# Patient Record
Sex: MALE | Race: WHITE | Employment: FULL TIME | URBAN - METROPOLITAN AREA
[De-identification: names, ages, dates, MRNs, and addresses within clinical notes are randomized per-mention and may not be internally consistent; named-entity substitution may affect disease eponyms.]

---

## 2017-01-03 ENCOUNTER — TELEPHONE (OUTPATIENT)
Dept: FAMILY MEDICINE CLINIC | Age: 57
End: 2017-01-03

## 2017-01-03 NOTE — TELEPHONE ENCOUNTER
Pt states that he was wanting to go to a Gastroenterologist for his colonoscopy and since there was not a doctor put on his referral request his approval is for a Dr in [facility]. He states that if you give him the information he can call and change it with  . Please advise.

## 2017-01-05 NOTE — TELEPHONE ENCOUNTER
Patient came into office and he will contact [facility] directly to attempt to change these referrals to local providers.

## 2017-01-06 ENCOUNTER — TELEPHONE (OUTPATIENT)
Dept: FAMILY MEDICINE CLINIC | Age: 57
End: 2017-01-06

## 2017-01-06 NOTE — TELEPHONE ENCOUNTER
Shannen Meléndez from [facility] called stated they received pts  referral but it has the wrong location. ector is requesting it to be corrected, it should be [address].  Please advise

## 2017-02-14 LAB — COLONOSCOPY, EXTERNAL: NORMAL

## 2017-03-27 ENCOUNTER — HOSPITAL ENCOUNTER (OUTPATIENT)
Dept: LAB | Age: 57
Discharge: HOME | End: 2017-03-27
Payer: OTHER GOVERNMENT

## 2017-03-27 DIAGNOSIS — E11.9 TYPE 2 DIABETES MELLITUS WITHOUT COMPLICATION, WITHOUT LONG-TERM CURRENT USE OF INSULIN (HCC): ICD-10-CM

## 2017-03-27 LAB
ALBUMIN SERPL BCP-MCNC: 4.1 G/DL (ref 3.4–5)
ALBUMIN/GLOB SERPL: 1.3 {RATIO} (ref 0.8–1.7)
ALP SERPL-CCNC: 49 U/L (ref 45–117)
ALT SERPL-CCNC: 27 U/L (ref 16–61)
ANION GAP BLD CALC-SCNC: 8 MMOL/L (ref 3–18)
AST SERPL W P-5'-P-CCNC: 20 U/L (ref 15–37)
BILIRUB SERPL-MCNC: 0.4 MG/DL (ref 0.2–1)
BUN SERPL-MCNC: 24 MG/DL (ref 7–18)
BUN/CREAT SERPL: 29 (ref 12–20)
CALCIUM SERPL-MCNC: 8.9 MG/DL (ref 8.5–10.1)
CHLORIDE SERPL-SCNC: 100 MMOL/L (ref 100–108)
CO2 SERPL-SCNC: 30 MMOL/L (ref 21–32)
CREAT SERPL-MCNC: 0.82 MG/DL (ref 0.6–1.3)
GLOBULIN SER CALC-MCNC: 3.1 G/DL (ref 2–4)
GLUCOSE SERPL-MCNC: 110 MG/DL (ref 74–99)
HBA1C MFR BLD: 6 % (ref 4.2–5.6)
POTASSIUM SERPL-SCNC: 3.9 MMOL/L (ref 3.5–5.5)
PROT SERPL-MCNC: 7.2 G/DL (ref 6.4–8.2)
SODIUM SERPL-SCNC: 138 MMOL/L (ref 136–145)

## 2017-03-27 PROCEDURE — 80053 COMPREHEN METABOLIC PANEL: CPT | Performed by: FAMILY MEDICINE

## 2017-03-27 PROCEDURE — 83036 HEMOGLOBIN GLYCOSYLATED A1C: CPT | Performed by: FAMILY MEDICINE

## 2017-03-27 PROCEDURE — 36415 COLL VENOUS BLD VENIPUNCTURE: CPT | Performed by: FAMILY MEDICINE

## 2017-04-03 ENCOUNTER — OFFICE VISIT (OUTPATIENT)
Dept: FAMILY MEDICINE CLINIC | Age: 57
End: 2017-04-03

## 2017-04-03 ENCOUNTER — HOSPITAL ENCOUNTER (OUTPATIENT)
Dept: LAB | Age: 57
Discharge: HOME | End: 2017-04-03
Payer: OTHER GOVERNMENT

## 2017-04-03 ENCOUNTER — HOSPITAL ENCOUNTER (OUTPATIENT)
Dept: GENERAL RADIOLOGY | Age: 57
Discharge: HOME | End: 2017-04-03
Payer: OTHER GOVERNMENT

## 2017-04-03 VITALS
HEART RATE: 86 BPM | HEIGHT: 71 IN | OXYGEN SATURATION: 98 % | RESPIRATION RATE: 16 BRPM | SYSTOLIC BLOOD PRESSURE: 130 MMHG | WEIGHT: 289 LBS | TEMPERATURE: 98.6 F | DIASTOLIC BLOOD PRESSURE: 76 MMHG | BODY MASS INDEX: 40.46 KG/M2

## 2017-04-03 DIAGNOSIS — E78.00 PURE HYPERCHOLESTEROLEMIA: ICD-10-CM

## 2017-04-03 DIAGNOSIS — I10 ESSENTIAL HYPERTENSION: ICD-10-CM

## 2017-04-03 DIAGNOSIS — M79.671 RIGHT FOOT PAIN: ICD-10-CM

## 2017-04-03 DIAGNOSIS — Z00.00 WELL ADULT EXAM: Primary | ICD-10-CM

## 2017-04-03 DIAGNOSIS — E11.9 TYPE 2 DIABETES MELLITUS WITHOUT COMPLICATION, WITHOUT LONG-TERM CURRENT USE OF INSULIN (HCC): ICD-10-CM

## 2017-04-03 DIAGNOSIS — Z98.890 HX OF COLONOSCOPY: ICD-10-CM

## 2017-04-03 PROCEDURE — 73630 X-RAY EXAM OF FOOT: CPT

## 2017-04-03 RX ORDER — LISINOPRIL 40 MG/1
40 TABLET ORAL DAILY
Qty: 90 TAB | Refills: 3 | Status: SHIPPED | OUTPATIENT
Start: 2017-04-03 | End: 2017-07-10 | Stop reason: SDUPTHER

## 2017-04-03 RX ORDER — ESOMEPRAZOLE MAGNESIUM 40 MG/1
40 CAPSULE, DELAYED RELEASE ORAL EVERY OTHER DAY
Qty: 90 CAP | Refills: 3 | Status: SHIPPED | OUTPATIENT
Start: 2017-04-03 | End: 2017-07-10

## 2017-04-03 RX ORDER — HYDROCHLOROTHIAZIDE 25 MG/1
25 TABLET ORAL DAILY
Qty: 90 TAB | Refills: 3 | Status: SHIPPED | OUTPATIENT
Start: 2017-04-03 | End: 2017-07-10 | Stop reason: SDUPTHER

## 2017-04-03 RX ORDER — ATORVASTATIN CALCIUM 20 MG/1
20 TABLET, FILM COATED ORAL DAILY
Qty: 90 TAB | Refills: 3 | Status: SHIPPED | OUTPATIENT
Start: 2017-04-03 | End: 2017-07-10 | Stop reason: SDUPTHER

## 2017-04-03 NOTE — PATIENT INSTRUCTIONS

## 2017-04-03 NOTE — PROGRESS NOTES
Chief Complaint   Patient presents with    Diabetes    Hypertension    Cholesterol Problem    Foot Injury     glass in left heel possibly     1. Have you been to the ER, urgent care clinic since your last visit? Hospitalized since your last visit? No    2. Have you seen or consulted any other health care providers outside of the [address] [place] [address] since your last visit? Include any pap smears or colon screening.  Dr. Poonam Rich 72 24/2017

## 2017-04-03 NOTE — MR AVS SNAPSHOT
Visit Information Date & Time Provider Department Dept. Phone Encounter #  
 4/3/2017  3:00 PM Jay Bolden, [address] [id number] Follow-up Instructions Return in about 3 months (around 7/3/2017), or if symptoms worsen or fail to improve. Your Appointments 4/3/2017  3:00 PM  
ROUTINE CARE with Jay Bolden MD  
[address] [facility] Appt Note: 3 mo fu  
 [address] [facility] [address]  
[address] [facility] [address] [facility] [address] Upcoming Health Maintenance Date Due COLONOSCOPY 3/1/2017 EYE EXAM RETINAL OR DILATED Q1 9/23/2017 HEMOGLOBIN A1C Q6M 9/27/2017 MICROALBUMIN Q1 12/20/2017 LIPID PANEL Q1 12/20/2017 FOOT EXAM Q1 12/28/2017 DTaP/Tdap/Td series (2 - Td) 2/5/2026 Allergies as of 4/3/2017  Review Complete On: 4/3/2017 By: Jay Bolden MD  
 No Known Allergies Current Immunizations  Reviewed on 11/5/2015 Name Date Hep B Vaccine (Adult) 5/5/2016, 12/3/2015, 11/5/2015 Influenza Vaccine (Quad) PF 10/1/2015 Pneumococcal Polysaccharide (PPSV-23) 11/5/2015 Tdap 2/5/2016 Not reviewed this visit You Were Diagnosed With   
  
 Codes Comments Well adult exam    -  Primary ICD-10-CM: Z00.00 ICD-9-CM: V70.0 Hx of colonoscopy     ICD-10-CM: Z98.890 ICD-9-CM: V45.89 Essential hypertension     ICD-10-CM: I10 
ICD-9-CM: 401.9 Pure hypercholesterolemia     ICD-10-CM: E78.00 ICD-9-CM: 272.0 Type 2 diabetes mellitus without complication, without long-term current use of insulin (HCC)     ICD-10-CM: E11.9 ICD-9-CM: 250.00 Right foot pain     ICD-10-CM: M79.671 ICD-9-CM: 729.5 Vitals BP Pulse Temp Resp Height(growth percentile) Weight(growth percentile)  130/76 (BP 1 Location: Left arm, BP Patient Position: Sitting) 86 98.6 °F (37 °C) (Oral) 16 5' 11\" (1.803 m) 289 lb (131.1 kg) SpO2 BMI Smoking Status 98% 40.31 kg/m2 Never Smoker Vitals History BMI and BSA Data Body Mass Index Body Surface Area  
 40.31 kg/m 2 2.56 m 2 Preferred Pharmacy Pharmacy Name Phone [address] [phone] Your Updated Medication List  
  
   
This list is accurate as of: 4/3/17  2:51 PM.  Always use your most recent med list.  
  
  
  
  
 aspirin delayed-release 81 mg tablet Take 1 Tab by mouth daily. atorvastatin 20 mg tablet Commonly known as:  LIPITOR Take 1 Tab by mouth daily. cholecalciferol (VITAMIN D3) 5,000 unit Tab tablet Commonly known as:  VITAMIN D3 Take 10,000 Units by mouth daily. esomeprazole 40 mg capsule Commonly known as:  NexIUM Take 1 Cap by mouth every other day. FISH OIL 1,000 mg Cap Generic drug:  omega-3 fatty acids-vitamin e Take 2 Caps by mouth two (2) times a day. glucose blood VI test strips strip Commonly known as:  PRECISION XTRA TEST Check fasting glucose once daily  
  
 hydroCHLOROthiazide 25 mg tablet Commonly known as:  HYDRODIURIL Take 1 Tab by mouth daily. lisinopril 40 mg tablet Commonly known as:  Marielena Piyush Take 1 Tab by mouth daily. magnesium oxide 400 mg tablet Commonly known as:  MAG-OX Take 400 mg by mouth daily. metFORMIN 1,000 mg tablet Commonly known as:  GLUCOPHAGE Take 1 Tab by mouth two (2) times daily (with meals). POTASSIUM CHLORIDE Take 595 mg by mouth daily. TANZEUM 30 mg/0.5 mL injector pen Generic drug:  albiglutide  
  
 vitamin E acetate 400 unit Cap capsule Take 400 Units by mouth daily. zinc 50 mg Tab tablet Take 50 mg by mouth daily. Prescriptions Sent to Pharmacy Refills  
 atorvastatin (LIPITOR) 20 mg tablet 3 Sig: Take 1 Tab by mouth daily. Class: Normal  
 Pharmacy: [address], [address] [facility] Ph #: [phone] Route: Oral  
 hydroCHLOROthiazide (HYDRODIURIL) 25 mg tablet 3 Sig: Take 1 Tab by mouth daily. Class: Normal  
 Pharmacy: [address], [address] [place] [address] Ph #: [phone] Route: Oral  
 lisinopril (PRINIVIL, ZESTRIL) 40 mg tablet 3 Sig: Take 1 Tab by mouth daily. Class: Normal  
 Pharmacy: [address], [address] [place] [address] Ph #: [phone] Route: Oral  
 esomeprazole (NEXIUM) 40 mg capsule 3 Sig: Take 1 Cap by mouth every other day. Class: Normal  
 Pharmacy: [address], [address] Ph #: [phone] Route: Oral  
  
Follow-up Instructions Return in about 3 months (around 7/3/2017), or if symptoms worsen or fail to improve. To-Do List   
 04/03/2017 Imaging:  XR FOOT RT MIN 3 V Patient Instructions Learning About Diabetes Food Guidelines Your Care Instructions Meal planning is important to manage diabetes. It helps keep your blood sugar at a target level (which you set with your doctor). You don't have to eat special foods. You can eat what your family eats, including sweets once in a while. But you do have to pay attention to how often you eat and how much you eat of certain foods. You may want to work with a dietitian or a certified diabetes educator (CDE) to help you plan meals and snacks. A dietitian or CDE can also help you lose weight if that is one of your goals. What should you know about eating carbs? Managing the amount of carbohydrate (carbs) you eat is an important part of healthy meals when you have diabetes. Carbohydrate is found in many foods. · Learn which foods have carbs. And learn the amounts of carbs in different foods.  
¨ Bread, cereal, pasta, and rice have about 15 grams of carbs in a serving. A serving is 1 slice of bread (1 ounce), ½ cup of cooked cereal, or 1/3 cup of cooked pasta or rice. ¨ Fruits have 15 grams of carbs in a serving. A serving is 1 small fresh fruit, such as an apple or orange; ½ of a banana; ½ cup of cooked or canned fruit; ½ cup of fruit juice; 1 cup of melon or raspberries; or 2 tablespoons of dried fruit. ¨ Milk and no-sugar-added yogurt have 15 grams of carbs in a serving. A serving is 1 cup of milk or 2/3 cup of no-sugar-added yogurt. ¨ Starchy vegetables have 15 grams of carbs in a serving. A serving is ½ cup of mashed potatoes or sweet potato; 1 cup winter squash; ½ of a small baked potato; ½ cup of cooked beans; or ½ cup cooked corn or green peas. · Learn how much carbs to eat each day and at each meal. A dietitian or CDE can teach you how to keep track of the amount of carbs you eat. This is called carbohydrate counting. · If you are not sure how to count carbohydrate grams, use the Plate Method to plan meals. It is a good, quick way to make sure that you have a balanced meal. It also helps you spread carbs throughout the day. ¨ Divide your plate by types of foods. Put non-starchy vegetables on half the plate, meat or other protein food on one-quarter of the plate, and a grain or starchy vegetable in the final quarter of the plate. To this you can add a small piece of fruit and 1 cup of milk or yogurt, depending on how many carbs you are supposed to eat at a meal. 
· Try to eat about the same amount of carbs at each meal. Do not \"save up\" your daily allowance of carbs to eat at one meal. 
· Proteins have very little or no carbs per serving. Examples of proteins are beef, chicken, turkey, fish, eggs, tofu, cheese, cottage cheese, and peanut butter. A serving size of meat is 3 ounces, which is about the size of a deck of cards.  Examples of meat substitute serving sizes (equal to 1 ounce of meat) are 1/4 cup of cottage cheese, 1 egg, 1 tablespoon of peanut butter, and ½ cup of tofu. How can you eat out and still eat healthy? · Learn to estimate the serving sizes of foods that have carbohydrate. If you measure food at home, it will be easier to estimate the amount in a serving of restaurant food. · If the meal you order has too much carbohydrate (such as potatoes, corn, or baked beans), ask to have a low-carbohydrate food instead. Ask for a salad or green vegetables. · If you use insulin, check your blood sugar before and after eating out to help you plan how much to eat in the future. · If you eat more carbohydrate at a meal than you had planned, take a walk or do other exercise. This will help lower your blood sugar. What else should you know? · Limit saturated fat, such as the fat from meat and dairy products. This is a healthy choice because people who have diabetes are at higher risk of heart disease. So choose lean cuts of meat and nonfat or low-fat dairy products. Use olive or canola oil instead of butter or shortening when cooking. · Don't skip meals. Your blood sugar may drop too low if you skip meals and take insulin or certain medicines for diabetes. · Check with your doctor before you drink alcohol. Alcohol can cause your blood sugar to drop too low. Alcohol can also cause a bad reaction if you take certain diabetes medicines. Follow-up care is a key part of your treatment and safety. Be sure to make and go to all appointments, and call your doctor if you are having problems. It's also a good idea to know your test results and keep a list of the medicines you take. Where can you learn more? Go to http://agus-john.info/. Enter F052 in the search box to learn more about \"Learning About Diabetes Food Guidelines. \" Current as of: May 23, 2016 Content Version: 11.2 © 3289-6702 [employer].  Care instructions adapted under license by [employer] (which disclaims liability or warranty for this information). If you have questions about a medical condition or this instruction, always ask your healthcare professional. [address] any warranty or liability for your use of this information. Introducing [facility] SERVICES! Dear Aurora Toribio: 
Thank you for requesting a Split account. Our records indicate that you already have an active Split account. You can access your account anytime at [email or web address]. [email or web address] Did you know that you can access your hospital and ER discharge instructions at any time in Split? You can also review all of your test results from your hospital stay or ER visit. Additional Information If you have questions, please visit the Frequently Asked Questions section of the Split website at [email or web address] Remember, Split is NOT to be used for urgent needs. For medical emergencies, dial 911. Now available from your iPhone and Android! Please provide this summary of care documentation to your next provider. Your primary care clinician is listed as Farida Clay. If you have any questions after today's visit, please call [phone].

## 2017-04-03 NOTE — PROGRESS NOTES
Subjective:   Micki Damon. is a 56 y.o. male presenting for his annual checkup. ROS:  Feeling well. No dyspnea or chest pain on exertion. No abdominal pain, change in bowel habits, black or bloody stools. No urinary tract or prostatic symptoms. No neurological complaints. Specific concerns today:   DM/HL-says doing well, continues to follow Diet/daily exercise. Reviewed labs. Taking medications regularly. Last week, broken glass at home he vacuumed, but feels a prickly pain on R heel, wondering if glass splinter inside. No bleeding, discharge. Pain when he rolls his R foot from side to side    Past Medical History:   Diagnosis Date    Agatston CAC score, <100 12/09/2015    Coronary calcium score 0.    Diabetes ([address]     Dyslipidemia     Echocardiogram 11/09/2015    EF 60%. No WMA. Mild LVH. Normal LV diastolic fx. Mild RVE.  GERD (gastroesophageal reflux disease)     Hx of colonoscopy 02/15/2017    repeat 5  yrs 2022, dr. Elle De Los Santos Hypertension     VALDES (nonalcoholic steatohepatitis)     Nuclear cardiac imaging test 11/09/2015    Low risk. No ischemia or infarction by perfusion imaging. EF 61%. No RWMA. Likely false positive EKG chgs on max EST. No chest pain. Ex time 9 mins 15 secs.  JANELLE on CPAP      History reviewed. No pertinent surgical history. Family History   Problem Relation Age of Onset    Hypertension Mother     Elevated Lipids Mother     Diabetes Mother     Cancer Father      Prostate     Social History   Substance Use Topics    Smoking status: Never Smoker    Smokeless tobacco: Never Used    Alcohol use Yes      Comment: 4/week          Objective:     Visit Vitals    /76 (BP 1 Location: Left arm, BP Patient Position: Sitting)    Pulse 86    Temp 98.6 °F (37 °C) (Oral)    Resp 16    Ht 5' 11\" (1.803 m)    Wt 289 lb (131.1 kg)    SpO2 98%    BMI 40.31 kg/m2     The patient appears well, alert, oriented x 3, in no distress.    ENT normal.  Neck supple. No adenopathy or thyromegaly. MANSOOR. Lungs are clear, good air entry, no wheezes, rhonchi or rales. S1 and S2 normal, no murmurs, regular rate and rhythm. Abdomen is soft without tenderness, guarding, mass or organomegaly. Extremities show no edema, normal peripheral pulses. R foot no tenderness, bleeding, discharge. Neurological is normal without focal findings. Assessment/Plan:   Glo Mckeon was seen today for diabetes, hypertension, cholesterol problem and foot injury. Diagnoses and all orders for this visit:    Well adult exam  healthy adult male  lose weight, increase physical activity, call if any problems. reviewed diet, exercise and weight control. Hx of colonoscopy-update 2022 dr Evelyn Lazaro  Will offer PSA test on next visit      Type 2 diabetes mellitus without complication ([facility] [address]  -well controlled, markedly improved from onset 10>5.7>6.    -continues to see endo, may transition care to me as per pt preference. -referral to podiatry  Eye exam 9/16  Foot exam- 12/16  Microalbumin- 12/16  Lipid panel -12/16  a1c 3/17    Hyperlipidemia  Good range on statin, to cont  Update 12/17    Essential hypertension-  controlled, on ace    Right foot pain  R/o foreign body, discussed that if not radio opaque, will not be seen in xray. i advised to see podiatrist, pt wants to hold off on this  -     XR FOOT RT MIN 3 V; Future    Other orders  -     atorvastatin (LIPITOR) 20 mg tablet; Take 1 Tab by mouth daily. -     hydroCHLOROthiazide (HYDRODIURIL) 25 mg tablet; Take 1 Tab by mouth daily. -     lisinopril (PRINIVIL, ZESTRIL) 40 mg tablet; Take 1 Tab by mouth daily. -     esomeprazole (NEXIUM) 40 mg capsule; Take 1 Cap by mouth every other day. Ff-up in 3 months or sooner prn. Patient/guardian understands plan of care. Patient has provided input and agrees with goals. Future labs to be discussed on next visit.

## 2017-04-04 NOTE — PROGRESS NOTES
Patient identified with 2 identifiers (name and ). Patient aware of no foreign body on x ray.  Patient advised to consult podiatry if pain persist x 2 weeks

## 2017-04-05 ENCOUNTER — TELEPHONE (OUTPATIENT)
Dept: FAMILY MEDICINE CLINIC | Age: 57
End: 2017-04-05

## 2017-04-05 NOTE — TELEPHONE ENCOUNTER
Pt is requesting a written request for the shingles vaccination [place] turns 63 yrs. He would like to know if he can pick it up first thing in the morning. Advised that Dr Tyrone Godinez  Was gone for the day. Please advise.

## 2017-05-19 RX ORDER — METFORMIN HYDROCHLORIDE 1000 MG/1
1000 TABLET ORAL 2 TIMES DAILY WITH MEALS
Qty: 180 TAB | Refills: 3 | Status: CANCELLED | OUTPATIENT
Start: 2017-05-19

## 2017-05-19 NOTE — TELEPHONE ENCOUNTER
This pharmacy faxed over request for the following prescriptions to be filled:    Medication requested :   Requested Prescriptions     Pending Prescriptions Disp Refills    metFORMIN (GLUCOPHAGE) 1,000 mg tablet 180 Tab 3     Sig: Take 1 Tab by mouth two (2) times daily (with meals).      PCP: [address] [place] [address] or Print: [facility]  Mail order or Local pharmacy Mail Order     Scheduled appointment if not seen by current providers in office: [address] [place] [address] 4/3/2017 f/u 7/10/2017

## 2017-05-19 NOTE — TELEPHONE ENCOUNTER
Called Ioana Whitifeld NP (Endocrine) office spoke with James Hubbard given information on refill request for metformin.

## 2017-07-10 ENCOUNTER — OFFICE VISIT (OUTPATIENT)
Dept: FAMILY MEDICINE CLINIC | Age: 57
End: 2017-07-10

## 2017-07-10 ENCOUNTER — HOSPITAL ENCOUNTER (OUTPATIENT)
Dept: LAB | Age: 57
Discharge: HOME | End: 2017-07-10
Payer: OTHER GOVERNMENT

## 2017-07-10 VITALS
TEMPERATURE: 98 F | SYSTOLIC BLOOD PRESSURE: 124 MMHG | DIASTOLIC BLOOD PRESSURE: 82 MMHG | BODY MASS INDEX: 40.88 KG/M2 | WEIGHT: 292 LBS | HEIGHT: 71 IN | OXYGEN SATURATION: 98 % | HEART RATE: 80 BPM | RESPIRATION RATE: 16 BRPM

## 2017-07-10 DIAGNOSIS — I10 ESSENTIAL HYPERTENSION: ICD-10-CM

## 2017-07-10 DIAGNOSIS — K21.9 GASTROESOPHAGEAL REFLUX DISEASE, ESOPHAGITIS PRESENCE NOT SPECIFIED: ICD-10-CM

## 2017-07-10 DIAGNOSIS — E11.9 TYPE 2 DIABETES MELLITUS WITHOUT COMPLICATION, WITHOUT LONG-TERM CURRENT USE OF INSULIN (HCC): Primary | ICD-10-CM

## 2017-07-10 DIAGNOSIS — E78.00 PURE HYPERCHOLESTEROLEMIA: ICD-10-CM
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
## 2017-07-10 PROCEDURE — 86618 LYME DISEASE ANTIBODY: CPT | Performed by: FAMILY MEDICINE

## 2017-07-10 PROCEDURE — 36415 COLL VENOUS BLD VENIPUNCTURE: CPT | Performed by: FAMILY MEDICINE

## 2017-07-10 RX ORDER — ATORVASTATIN CALCIUM 20 MG/1
20 TABLET, FILM COATED ORAL DAILY
Qty: 90 TAB | Refills: 3 | Status: SHIPPED | OUTPATIENT
Start: 2017-07-10

## 2017-07-10 RX ORDER — HYDROCHLOROTHIAZIDE 25 MG/1
25 TABLET ORAL DAILY
Qty: 90 TAB | Refills: 3 | Status: SHIPPED | OUTPATIENT
Start: 2017-07-10

## 2017-07-10 RX ORDER — PANTOPRAZOLE SODIUM 20 MG/1
20 TABLET, DELAYED RELEASE ORAL DAILY
Qty: 90 TAB | Refills: 2 | Status: SHIPPED | OUTPATIENT
Start: 2017-07-10 | End: 2020-04-21

## 2017-07-10 RX ORDER — LISINOPRIL 40 MG/1
40 TABLET ORAL DAILY
Qty: 90 TAB | Refills: 3 | Status: SHIPPED | OUTPATIENT
Start: 2017-07-10

## 2017-07-10 RX ORDER — ASPIRIN 81 MG/1
81 TABLET ORAL DAILY
Qty: 90 TAB | Refills: 3 | Status: SHIPPED | OUTPATIENT
Start: 2017-07-10

## 2017-07-10 NOTE — PATIENT INSTRUCTIONS

## 2017-07-10 NOTE — MR AVS SNAPSHOT
Visit Information Date & Time Provider Department Dept. Phone Encounter #  
 7/10/2017  3:00 PM Cindi Ruff, [address] [facility] [address] [id number] Follow-up Instructions Return in about 5 months (around 12/10/2017), or if symptoms worsen or fail to improve. Upcoming Health Maintenance Date Due INFLUENZA AGE 9 TO ADULT 8/1/2017 EYE EXAM RETINAL OR DILATED Q1 9/23/2017 HEMOGLOBIN A1C Q6M 9/27/2017 MICROALBUMIN Q1 12/20/2017 LIPID PANEL Q1 12/20/2017 FOOT EXAM Q1 12/28/2017 COLONOSCOPY 2/14/2022 DTaP/Tdap/Td series (2 - Td) 2/5/2026 Allergies as of 7/10/2017  Review Complete On: 7/10/2017 By: Cindi Ruff MD  
 No Known Allergies Current Immunizations  Reviewed on 11/5/2015 Name Date Hep B Vaccine (Adult) 5/5/2016, 12/3/2015, 11/5/2015 Influenza Vaccine (Quad) PF 10/1/2015 Pneumococcal Polysaccharide (PPSV-23) 11/5/2015 Tdap 2/5/2016 Not reviewed this visit You Were Diagnosed With   
  
 Codes Comments Type 2 diabetes mellitus without complication, without long-term current use of insulin ([facility])    -  Primary ICD-10-CM: E11.9 ICD-9-CM: 250.00 Essential hypertension     ICD-10-CM: I10 
ICD-9-CM: 401.9 Pure hypercholesterolemia     ICD-10-CM: E78.00 ICD-9-CM: 272.0 Gastroesophageal reflux disease, esophagitis presence not specified     ICD-10-CM: K21.9 ICD-9-CM: 530.81 Vitals BP Pulse Temp Resp Height(growth percentile) Weight(growth percentile) 124/82 (BP 1 Location: Left arm, BP Patient Position: Sitting) 80 98 °F (36.7 °C) (Oral) 16 5' 11\" (1.803 m) 292 lb (132.5 kg) SpO2 BMI Smoking Status 98% 40.73 kg/m2 Never Smoker Vitals History BMI and BSA Data Body Mass Index Body Surface Area 40.73 kg/m 2 2.58 m 2 Preferred Pharmacy Pharmacy Name Phone  [address] [facility] [phone] Your Updated Medication List  
  
   
This list is accurate as of: 7/10/17  3:03 PM.  Always use your most recent med list.  
  
  
  
  
 aspirin delayed-release 81 mg tablet Take 1 Tab by mouth daily. atorvastatin 20 mg tablet Commonly known as:  LIPITOR Take 1 Tab by mouth daily. cholecalciferol (VITAMIN D3) 5,000 unit Tab tablet Commonly known as:  VITAMIN D3 Take 10,000 Units by mouth daily. FISH OIL 1,000 mg Cap Generic drug:  omega-3 fatty acids-vitamin e Take 2 Caps by mouth two (2) times a day. glucose blood VI test strips strip Commonly known as:  PRECISION XTRA TEST Check fasting glucose once daily  
  
 hydroCHLOROthiazide 25 mg tablet Commonly known as:  HYDRODIURIL Take 1 Tab by mouth daily. lisinopril 40 mg tablet Commonly known as:  Helon Estrin Take 1 Tab by mouth daily. magnesium oxide 400 mg tablet Commonly known as:  MAG-OX Take 400 mg by mouth daily. metFORMIN 1,000 mg tablet Commonly known as:  GLUCOPHAGE Take 1 Tab by mouth two (2) times daily (with meals). pantoprazole 20 mg tablet Commonly known as:  PROTONIX Take 1 Tab by mouth daily. POTASSIUM CHLORIDE Take 595 mg by mouth daily. TANZEUM 30 mg/0.5 mL injector pen Generic drug:  albiglutide  
  
 vitamin E acetate 400 unit Cap capsule Take 400 Units by mouth daily. zinc 50 mg Tab tablet Take 50 mg by mouth daily. Prescriptions Sent to Pharmacy Refills  
 atorvastatin (LIPITOR) 20 mg tablet 3 Sig: Take 1 Tab by mouth daily. Class: Normal  
 Pharmacy: [address], [address] Ph #: [phone] Route: Oral  
 hydroCHLOROthiazide (HYDRODIURIL) 25 mg tablet 3 Sig: Take 1 Tab by mouth daily. Class: Normal  
 Pharmacy: [address], [address] Ph #: [phone]  Route: Oral  
 lisinopril (PRINIVIL, ZESTRIL) 40 mg tablet 3 Sig: Take 1 Tab by mouth daily. Class: Normal  
 Pharmacy: [address], [address] Ph #: [phone] Route: Oral  
 aspirin delayed-release 81 mg tablet 3 Sig: Take 1 Tab by mouth daily. Class: Normal  
 Pharmacy: [address], [address] [facility] Ph #: [phone] Route: Oral  
 pantoprazole (PROTONIX) 20 mg tablet 2 Sig: Take 1 Tab by mouth daily. Class: Normal  
 Pharmacy: [address], [address] [facility] Ph #: [phone] Route: Oral  
  
Follow-up Instructions Return in about 5 months (around 12/10/2017), or if symptoms worsen or fail to improve. Patient Instructions Learning About Diabetes Food Guidelines Your Care Instructions Meal planning is important to manage diabetes. It helps keep your blood sugar at a target level (which you set with your doctor). You don't have to eat special foods. You can eat what your family eats, including sweets once in a while. But you do have to pay attention to how often you eat and how much you eat of certain foods. You may want to work with a dietitian or a certified diabetes educator (CDE) to help you plan meals and snacks. A dietitian or CDE can also help you lose weight if that is one of your goals. What should you know about eating carbs? Managing the amount of carbohydrate (carbs) you eat is an important part of healthy meals when you have diabetes. Carbohydrate is found in many foods. · Learn which foods have carbs. And learn the amounts of carbs in different foods. ¨ Bread, cereal, pasta, and rice have about 15 grams of carbs in a serving. A serving is 1 slice of bread (1 ounce), ½ cup of cooked cereal, or 1/3 cup of cooked pasta or rice. ¨ Fruits have 15 grams of carbs in a serving.  A serving is 1 small fresh fruit, such as an apple or orange; ½ of a banana; ½ cup of cooked or canned fruit; ½ cup of fruit juice; 1 cup of melon or raspberries; or 2 tablespoons of dried fruit. ¨ Milk and no-sugar-added yogurt have 15 grams of carbs in a serving. A serving is 1 cup of milk or 2/3 cup of no-sugar-added yogurt. ¨ Starchy vegetables have 15 grams of carbs in a serving. A serving is ½ cup of mashed potatoes or sweet potato; 1 cup winter squash; ½ of a small baked potato; ½ cup of cooked beans; or ½ cup cooked corn or green peas. · Learn how much carbs to eat each day and at each meal. A dietitian or CDE can teach you how to keep track of the amount of carbs you eat. This is called carbohydrate counting. · If you are not sure how to count carbohydrate grams, use the Plate Method to plan meals. It is a good, quick way to make sure that you have a balanced meal. It also helps you spread carbs throughout the day. ¨ Divide your plate by types of foods. Put non-starchy vegetables on half the plate, meat or other protein food on one-quarter of the plate, and a grain or starchy vegetable in the final quarter of the plate. To this you can add a small piece of fruit and 1 cup of milk or yogurt, depending on how many carbs you are supposed to eat at a meal. 
· Try to eat about the same amount of carbs at each meal. Do not \"save up\" your daily allowance of carbs to eat at one meal. 
· Proteins have very little or no carbs per serving. Examples of proteins are beef, chicken, turkey, fish, eggs, tofu, cheese, cottage cheese, and peanut butter. A serving size of meat is 3 ounces, which is about the size of a deck of cards. Examples of meat substitute serving sizes (equal to 1 ounce of meat) are 1/4 cup of cottage cheese, 1 egg, 1 tablespoon of peanut butter, and ½ cup of tofu. How can you eat out and still eat healthy? · Learn to estimate the serving sizes of foods that have carbohydrate.  If you measure food at home, it will be easier to estimate the amount in a serving of restaurant food. · If the meal you order has too much carbohydrate (such as potatoes, corn, or baked beans), ask to have a low-carbohydrate food instead. Ask for a salad or green vegetables. · If you use insulin, check your blood sugar before and after eating out to help you plan how much to eat in the future. · If you eat more carbohydrate at a meal than you had planned, take a walk or do other exercise. This will help lower your blood sugar. What else should you know? · Limit saturated fat, such as the fat from meat and dairy products. This is a healthy choice because people who have diabetes are at higher risk of heart disease. So choose lean cuts of meat and nonfat or low-fat dairy products. Use olive or canola oil instead of butter or shortening when cooking. · Don't skip meals. Your blood sugar may drop too low if you skip meals and take insulin or certain medicines for diabetes. · Check with your doctor before you drink alcohol. Alcohol can cause your blood sugar to drop too low. Alcohol can also cause a bad reaction if you take certain diabetes medicines. Follow-up care is a key part of your treatment and safety. Be sure to make and go to all appointments, and call your doctor if you are having problems. It's also a good idea to know your test results and keep a list of the medicines you take. Where can you learn more? Go to http://agus-john.info/. Enter U823 in the search box to learn more about \"Learning About Diabetes Food Guidelines. \" Current as of: March 13, 2017 Content Version: 11.3 © 2481-8144 [employer]wise, [employer]. Care instructions adapted under license by [employer] (which disclaims liability or warranty for this information).  If you have questions about a medical condition or this instruction, always ask your healthcare professional. [employer] disclaims any warranty or liability for your use of this information. Introducing [facility]! Dear Denzel Ghosh: 
Thank you for requesting a Tri[employer] account. Our records indicate that you already have an active Triposo account. You can access your account anytime at [email or web address] ([email or web address]). [employer] ([email or web address]) Did you know that you can access your hospital and ER discharge instructions at any time in Tri[employer]o? You can also review all of your test results from your hospital stay or ER visit. Additional Information If you have questions, please visit the Frequently Asked Questions section of the Triposo website at [email or web address] ([email or web address]). [employer] ([employer])/. Remember, Tri[employer]o is NOT to be used for urgent needs. For medical emergencies, dial 911. Now available from your iPhone and Android! Please provide this summary of care documentation to your next provider. Your primary care clinician is listed as Catrachita Porter. If you have any questions after today's visit, please call [phone].

## 2017-07-10 NOTE — PROGRESS NOTES
Chief Complaint   Patient presents with    Diabetes     wants to know if you can control his diabetes instead of Dr. Joanna Gutierrez    Hypertension    Cholesterol Problem    Other     requesting test for Lymes disease/ joint pain muscle  pain    GERD     Nexium needs to be changed due to      1. Have you been to the ER, urgent care clinic since your last visit? Hospitalized since your last visit? No    2. Have you seen or consulted any other health care providers outside of the [facility] since your last visit? Include any pap smears or colon screening.  No

## 2017-07-12 LAB — B BURGDOR IGG+IGM SER-ACNC: <0.91 ISR (ref 0–0.9)

## 2017-08-16 ENCOUNTER — TELEPHONE (OUTPATIENT)
Dept: FAMILY MEDICINE CLINIC | Age: 57
End: 2017-08-16

## 2017-08-16 NOTE — TELEPHONE ENCOUNTER
Patient is requesting (New  Updated) referral to the following office:    Speciality: Endocrinology  Specialist Name: ROYA Jarvis Atkins  Office Location: [address]   Phone (if available): [phone]  Fax (if available): [phone]  Diagnosis: E 29.1 E 11.9  Date of appointment (if scheduled): 8/18/2017  Prime

## 2017-08-21 LAB
LDL-C, EXTERNAL: 76
MICROALBUMIN UR TEST STR-MCNC: <3 MG/DL

## 2017-09-05 ENCOUNTER — OFFICE VISIT (OUTPATIENT)
Dept: FAMILY MEDICINE CLINIC | Age: 57
End: 2017-09-05

## 2017-09-05 VITALS
WEIGHT: 298 LBS | BODY MASS INDEX: 41.72 KG/M2 | TEMPERATURE: 97.8 F | SYSTOLIC BLOOD PRESSURE: 126 MMHG | HEIGHT: 71 IN | DIASTOLIC BLOOD PRESSURE: 78 MMHG | HEART RATE: 61 BPM | RESPIRATION RATE: 16 BRPM | OXYGEN SATURATION: 97 %

## 2017-09-05 DIAGNOSIS — E11.9 TYPE 2 DIABETES MELLITUS WITHOUT COMPLICATION, WITHOUT LONG-TERM CURRENT USE OF INSULIN (HCC): ICD-10-CM

## 2017-09-05 DIAGNOSIS — H00.16 CHALAZION, LEFT: Primary | ICD-10-CM

## 2017-09-05 RX ORDER — ESOMEPRAZOLE MAGNESIUM 40 MG/1
40 CAPSULE, DELAYED RELEASE ORAL
COMMUNITY
Start: 2017-06-25 | End: 2017-09-05 | Stop reason: CLARIF

## 2017-09-05 RX ORDER — CEPHALEXIN 500 MG/1
500 CAPSULE ORAL 3 TIMES DAILY
Qty: 30 CAP | Refills: 0 | Status: SHIPPED | OUTPATIENT
Start: 2017-09-05 | End: 2017-09-15

## 2017-09-05 RX ORDER — METFORMIN HYDROCHLORIDE 500 MG/1
500 TABLET ORAL 2 TIMES DAILY
COMMUNITY
Start: 2017-07-18

## 2017-09-05 NOTE — PROGRESS NOTES
1. Have you been to the ER, urgent care clinic since your last visit? Hospitalized since your last visit? No    2. Have you seen or consulted any other health care providers outside of the [address] [place] [address] since your last visit? Include any pap smears or colon screening.  No

## 2017-09-05 NOTE — PATIENT INSTRUCTIONS
Styes and Chalazia: Care Instructions  Your Care Instructions    Styes and chalazia (say \"myf-XEM-atz-uh\") are both conditions that can cause swelling of the eyelid. A stye is an infection in the root of an eyelash. The infection causes a tender red lump on the edge of the eyelid. The infection can spread until the whole eyelid becomes red and inflamed. Styes usually break open, and a tiny amount of pus drains. They usually clear up on their own in about a week, but they sometimes need treatment with antibiotics. A chalazion is a lump or cyst in the eyelid (chalazion is singular; chalazia is plural). It is caused by swelling and inflammation of deep oil glands inside the eyelid. Chalazia are usually not infected. They can take a few months to heal.  If a chalazion becomes more swollen and painful or does not go away, you may need to have it drained by your doctor. Follow-up care is a key part of your treatment and safety. Be sure to make and go to all appointments, and call your doctor if you are having problems. It's also a good idea to know your test results and keep a list of the medicines you take. How can you care for yourself at home? · Do not rub your eyes. Do not squeeze or try to open a stye or chalazion. · To help a stye or chalazion heal faster:  ¨ Put a warm, moist compress on your eye for 5 to 10 minutes, 3 to 6 times a day. Heat often brings a stye to a point where it drains on its own. Keep in mind that warm compresses will often increase swelling a little at first.  ¨ Do not use hot water or heat a wet cloth in a microwave oven. The compress may get too hot and can burn the eyelid. · Always wash your hands before and after you use a compress or touch your eyes. · If the doctor gave you antibiotic drops or ointment, use the medicine exactly as directed. Use the medicine for as long as instructed, even if your eye starts to feel better.   · To put in eyedrops or ointment:  ¨ Tilt your head back, and pull your lower eyelid down with one finger. ¨ Drop or squirt the medicine inside the lower lid. ¨ Close your eye for 30 to 60 seconds to let the drops or ointment move around. ¨ Do not touch the ointment or dropper tip to your eyelashes or any other surface. · Do not wear eye makeup or contact lenses until the stye or chalazion heals. · Do not share towels, pillows, or washcloths while you have a stye. When should you call for help? Call your doctor now or seek immediate medical care if:  · You have pain in your eye. · You have a change in vision or loss of vision. · Redness and swelling get much worse. Watch closely for changes in your health, and be sure to contact your doctor if:  · Your stye does not get better in 1 week. · Your chalazion does not start to get better after several weeks. Where can you learn more? Go to http://agus-john.info/. Enter J832 in the search box to learn more about \"Styes and Chalazia: Care Instructions. \"  Current as of: March 3, 2017  Content Version: 11.3  © 4374-6770 [employer]. Care instructions adapted under license by [employer] (which disclaims liability or warranty for this information). If you have questions about a medical condition or this instruction, always ask your healthcare professional. [address] any warranty or liability for your use of this information.

## 2017-09-05 NOTE — PROGRESS NOTES
SUBJECTIVE  Chief Complaint   Patient presents with   Sabina Rae left inner lower eye lid ongoing past 1 week     The patient presents complaining of a 7 day history left lower eyelid lump. The patient denies discharge and crusting. The patient denies any vision changes, orbital swelling, photophobia, or eye pain. The patient has tried warm compresses and massage. He says that his diabetes is well controlled and is supported by his latest A1c of 6%. He says he can do better with getting more exercise. OBJECTIVE    Blood pressure 126/78, pulse 61, temperature 97.8 °F (36.6 °C), temperature source Oral, resp. rate 16, height 5' 11\" (1.803 m), weight 298 lb (135.2 kg), SpO2 97 %. General:  Alert, cooperative, well appearing, in no apparent distress. Head:  Head is symmetric, normocephalic without evidence of trauma or deformity. Eyes:  The extra-ocular movements are intact. The left lower medial eyelid has a mildly erythematous lump that is about 0.8cm in size. The conjunctiva of the eye is not red nor injected. There is no evidence of scant discharge at the angle of the eye. Skin:  No periorbital swelling or rashes. Psych: normal affect. Mood good. Oriented x 3. Judgement and insight intact. ASSESSMENT / PLAN      ICD-10-CM ICD-9-CM    1. Chalazion, left H00.16 373.2 REFERRAL TO OPHTHALMOLOGY   2. Type 2 diabetes mellitus without complication, without long-term current use of insulin ([facility]) E11.9 250.00    3. BMI 40.0-44.9, adult ([facility] Z68.41 V85.41      Chalazion - pt ed handout. Cont warm compress and gentle massage. He will start keflex 500mg po tid for 10 days. Refer to ophthalmology if not better. DM2 - cont diet and exercise. Has follow-up with PCP in a few months. Obesity - encourage regular exercise. 15 minutes of face to face time spent with the patient with at least 50% on counseling on above medical issues.     All chart history elements were reviewed by me at the time of the visit even though marked at time of note closure. Patient understands our medical plan. Patient has provided input and agrees with goals. Alternatives have been explained and offered. All questions answered. The patient is to call if condition worsens or fails to improve. Follow-up Disposition:  Return Monday, December 18, 2017 02:00 PM with Dr. Selene Osgood.  Please call in 1 week with an eye update.

## 2017-09-05 NOTE — MR AVS SNAPSHOT
Visit Information Date & Time Provider Department Dept. Phone Encounter #  
 9/5/2017  1:30 PM [facility], [address] [place] [address] [id number] Follow-up Instructions Return Monday, December 18, 2017 02:00 PM with Dr. Elli Centeno.  Please call in 1 week with an eye update. Your Appointments 12/18/2017  2:00 PM  
ROUTINE CARE with Justina Wright MD  
[facility] ([address]) Appt Note: routine f/u 5mo [address] [facility] [address]  
[address] [facility] [address] [facility] [address] Upcoming Health Maintenance Date Due INFLUENZA AGE 9 TO ADULT 8/1/2017 EYE EXAM RETINAL OR DILATED Q1 9/23/2017 HEMOGLOBIN A1C Q6M 9/27/2017 MICROALBUMIN Q1 12/20/2017 LIPID PANEL Q1 12/20/2017 FOOT EXAM Q1 12/28/2017 COLONOSCOPY 2/14/2022 DTaP/Tdap/Td series (2 - Td) 2/5/2026 Allergies as of 9/5/2017  Review Complete On: 7/10/2017 By: Justina Wright MD  
 No Known Allergies Current Immunizations  Reviewed on 9/5/2017 Name Date Hep B Vaccine (Adult) 5/5/2016, 12/3/2015, 11/5/2015 Influenza Vaccine (Quad) PF 10/1/2015 Pneumococcal Polysaccharide (PPSV-23) 11/5/2015 Tdap 2/5/2016 Reviewed by Chelsea Cotton on 9/5/2017 at  1:03 PM  
You Were Diagnosed With   
  
 Codes Comments Chalazion, left    -  Primary ICD-10-CM: H00.16 ICD-9-CM: 373.2 Type 2 diabetes mellitus without complication, without long-term current use of insulin (HCC)     ICD-10-CM: E11.9 ICD-9-CM: 250.00 BMI 40.0-44.9, adult [facility])     ICD-10-CM: Z68.41 
ICD-9-CM: V85.41 Vitals BP Pulse Temp Resp Height(growth percentile) Weight(growth percentile) 126/78 (BP 1 Location: Left arm, BP Patient Position: Sitting) 61 97.8 °F (36.6 °C) (Oral) 16 5' 11\" (1.803 m) 298 lb (135.2 kg) SpO2 BMI Smoking Status 97% 41.56 kg/m2 Never Smoker Vitals History BMI and BSA Data Body Mass Index Body Surface Area 41.56 kg/m 2 2.6 m 2 Preferred Pharmacy Pharmacy Name Phone [facility] @ [address] [facility], [address] [place] [address] Roxane Durán [phone] Your Updated Medication List  
  
   
This list is accurate as of: 9/5/17  1:31 PM.  Always use your most recent med list.  
  
  
  
  
 aspirin delayed-release 81 mg tablet Take 1 Tab by mouth daily. atorvastatin 20 mg tablet Commonly known as:  LIPITOR Take 1 Tab by mouth daily. cephALEXin 500 mg capsule Commonly known as:  Lindie Rand Take 1 Cap by mouth three (3) times daily for 10 days. cholecalciferol (VITAMIN D3) 5,000 unit Tab tablet Commonly known as:  VITAMIN D3 Take 10,000 Units by mouth daily. FISH OIL 1,000 mg Cap Generic drug:  omega-3 fatty acids-vitamin e Take 2 Caps by mouth two (2) times a day. glucose blood VI test strips strip Commonly known as:  PRECISION XTRA TEST Check fasting glucose once daily  
  
 hydroCHLOROthiazide 25 mg tablet Commonly known as:  HYDRODIURIL Take 1 Tab by mouth daily. lisinopril 40 mg tablet Commonly known as:  Yoel Julito Take 1 Tab by mouth daily. magnesium oxide 400 mg tablet Commonly known as:  MAG-OX Take 400 mg by mouth daily. * metFORMIN 1,000 mg tablet Commonly known as:  GLUCOPHAGE Take 1 Tab by mouth two (2) times daily (with meals). * metFORMIN 500 mg tablet Commonly known as:  GLUCOPHAGE Take 500 mg by mouth two (2) times a day. pantoprazole 20 mg tablet Commonly known as:  PROTONIX Take 1 Tab by mouth daily. POTASSIUM CHLORIDE Take 595 mg by mouth daily. TANZEUM 30 mg/0.5 mL injector pen Generic drug:  albiglutide  
  
 vitamin E acetate 400 unit Cap capsule Take 400 Units by mouth daily. zinc 50 mg Tab tablet Take 50 mg by mouth daily. * Notice: This list has 2 medication(s) that are the same as other medications prescribed for you. Read the directions carefully, and ask your doctor or other care provider to review them with you. Prescriptions Sent to Pharmacy Refills  
 cephALEXin (KEFLEX) 500 mg capsule 0 Sig: Take 1 Cap by mouth three (3) times daily for 10 days. Class: Normal  
 Pharmacy: [id number] @ [address]  #: [phone] Route: Oral  
  
We Performed the Following REFERRAL TO OPHTHALMOLOGY [REF57 Custom] Comments:  
 Please evaluate patient for chalazion Follow-up Instructions Return Monday, December 18, 2017 02:00 PM with Dr. Kacie Nguyen.  Please call in 1 week with an eye update. Referral Information Referral ID Referred By Referred To  
  
 [id number] Veva Riedel, MD   
   [address] [place] [address], [place] [address] [address] [facility], [facility] [address] Phone: [phone] Fax: [phone] Visits Status Start Date End Date 1 New Request 9/5/17 9/5/18 If your referral has a status of pending review or denied, additional information will be sent to support the outcome of this decision. Patient Instructions Styes and Chalazia: Care Instructions Your Care Instructions Styes and chalazia (say \"dpp-VQD-fyc-\") are both conditions that can cause swelling of the eyelid. A stye is an infection in the root of an eyelash. The infection causes a tender red lump on the edge of the eyelid. The infection can spread until the whole eyelid becomes red and inflamed. Styes usually break open, and a tiny amount of pus drains. They usually clear up on their own in about a week, but they sometimes need treatment with antibiotics. A chalazion is a lump or cyst in the eyelid (chalazion is singular; chalazia is plural).  It is caused by swelling and inflammation of deep oil glands inside the eyelid. Chalazia are usually not infected. They can take a few months to heal. 
If a chalazion becomes more swollen and painful or does not go away, you may need to have it drained by your doctor. Follow-up care is a key part of your treatment and safety. Be sure to make and go to all appointments, and call your doctor if you are having problems. It's also a good idea to know your test results and keep a list of the medicines you take. How can you care for yourself at home? · Do not rub your eyes. Do not squeeze or try to open a stye or chalazion. · To help a stye or chalazion heal faster: ¨ Put a warm, moist compress on your eye for 5 to 10 minutes, 3 to 6 times a day. Heat often brings a stye to a point where it drains on its own. Keep in mind that warm compresses will often increase swelling a little at first. 
¨ Do not use hot water or heat a wet cloth in a microwave oven. The compress may get too hot and can burn the eyelid. · Always wash your hands before and after you use a compress or touch your eyes. · If the doctor gave you antibiotic drops or ointment, use the medicine exactly as directed. Use the medicine for as long as instructed, even if your eye starts to feel better. · To put in eyedrops or ointment: ¨ Tilt your head back, and pull your lower eyelid down with one finger. ¨ Drop or squirt the medicine inside the lower lid. ¨ Close your eye for 30 to 60 seconds to let the drops or ointment move around. ¨ Do not touch the ointment or dropper tip to your eyelashes or any other surface. · Do not wear eye makeup or contact lenses until the stye or chalazion heals. · Do not share towels, pillows, or washcloths while you have a stye. When should you call for help? Call your doctor now or seek immediate medical care if: 
· You have pain in your eye. · You have a change in vision or loss of vision. · Redness and swelling get much worse. Watch closely for changes in your health, and be sure to contact your doctor if: 
· Your stye does not get better in 1 week. · Your chalazion does not start to get better after several weeks. Where can you learn more? Go to http://agus-john.info/. Enter P396 in the search box to learn more about \"Styes and Chalazia: Care Instructions. \" Current as of: March 3, 2017 Content Version: 11.3 © 5565-2913 [employer]. Care instructions adapted under license by [employer] (which disclaims liability or warranty for this information). If you have questions about a medical condition or this instruction, always ask your healthcare professional. [address] any warranty or liability for your use of this information. Introducing [facility]! Dear Kalyan Lilly: 
Thank you for requesting a [employer]elli[employer] account. Our records indicate that you already have an active [employer]els account. You can access your account anytime at [email or web address]. [employer] Did you know that you can access your hospital and ER discharge instructions at any time in [employer]? You can also review all of your test results from your hospital stay or ER visit. Additional Information If you have questions, please visit the Frequently Asked Questions section of the [employer] website at [email or web address]. [employer]/. Remember, [employer]elli[employer]els is NOT to be used for urgent needs. For medical emergencies, dial 911. Now available from your iPhone and Android! Please provide this summary of care documentation to your next provider. Your primary care clinician is listed as Naren Gaitan. If you have any questions after today's visit, please call [phone].

## 2017-12-20 LAB — HBA1C MFR BLD HPLC: 6.1 %

## 2019-04-16 ENCOUNTER — TELEPHONE (OUTPATIENT)
Dept: FAMILY MEDICINE CLINIC | Age: 59
End: 2019-04-16

## 2020-04-21 ENCOUNTER — VIRTUAL VISIT (OUTPATIENT)
Dept: FAMILY MEDICINE CLINIC | Age: 60
End: 2020-04-21

## 2020-04-21 DIAGNOSIS — I10 ESSENTIAL HYPERTENSION: ICD-10-CM

## 2020-04-21 DIAGNOSIS — R79.89 LOW TESTOSTERONE IN MALE: ICD-10-CM

## 2020-04-21 DIAGNOSIS — E11.9 TYPE 2 DIABETES MELLITUS WITHOUT COMPLICATION, WITHOUT LONG-TERM CURRENT USE OF INSULIN (HCC): ICD-10-CM

## 2020-04-21 DIAGNOSIS — G47.33 OSA ON CPAP: ICD-10-CM

## 2020-04-21 DIAGNOSIS — E78.00 PURE HYPERCHOLESTEROLEMIA: ICD-10-CM

## 2020-04-21 DIAGNOSIS — M25.562 ACUTE PAIN OF LEFT KNEE: Primary | ICD-10-CM

## 2020-04-21 DIAGNOSIS — Z99.89 OSA ON CPAP: ICD-10-CM

## 2020-04-21 NOTE — PATIENT INSTRUCTIONS
Knee: Exercises  Introduction  Here are some examples of exercises for you to try. The exercises may be suggested for a condition or for rehabilitation. Start each exercise slowly. Ease off the exercises if you start to have pain. You will be told when to start these exercises and which ones will work best for you. How to do the exercises  Quad sets   1. Sit with your leg straight and supported on the floor or a firm bed. (If you feel discomfort in the front or back of your knee, place a small towel roll under your knee.)  2. Tighten the muscles on top of your thigh by pressing the back of your knee flat down to the floor. (If you feel discomfort under your kneecap, place a small towel roll under your knee.)  3. Hold for about 6 seconds, then rest for up to 10 seconds. 4. Do 8 to 12 repetitions several times a day. Straight-leg raises to the front   1. Lie on your back with your good knee bent so that your foot rests flat on the floor. Your injured leg should be straight. Make sure that your low back has a normal curve. You should be able to slip your flat hand in between the floor and the small of your back, with your palm touching the floor and your back touching the back of your hand. 2. Tighten the thigh muscles in the injured leg by pressing the back of your knee flat down to the floor. Hold your knee straight. 3. Keeping the thigh muscles tight, lift your injured leg up so that your heel is about 12 inches off the floor. Hold for about 6 seconds and then lower slowly. 4. Do 8 to 12 repetitions, 3 times a day. Straight-leg raises to the outside   1. Lie on your side, with your injured leg on top. 2. Tighten the front thigh muscles of your injured leg to keep your knee straight. 3. Keep your hip and your leg straight in line with the rest of your body, and keep your knee pointing forward. Do not drop your hip back.   4. Lift your injured leg straight up toward the ceiling, about 12 inches off the floor. Hold for about 6 seconds, then slowly lower your leg. 5. Do 8 to 12 repetitions. Straight-leg raises to the back   1. Lie on your stomach, and lift your leg straight up behind you (toward the ceiling). 2. Lift your toes about 6 inches off the floor, hold for about 6 seconds, then lower slowly. 3. Do 8 to 12 repetitions. Straight-leg raises to the inside   1. Lie on the side of your body with the injured leg. 2. You can either prop your other (good) leg up on a chair, or you can bend your good knee and put that foot in front of your injured knee. Do not drop your hip back. 3. Tighten the muscles on the front of your thigh to straighten your injured knee. 4. Keep your kneecap pointing forward, and lift your whole leg up toward the ceiling about 6 inches. Hold for about 6 seconds, then lower slowly. 5. Do 8 to 12 repetitions. Heel dig bridging   1. Lie on your back with both knees bent and your ankles bent so that only your heels are digging into the floor. Your knees should be bent about 90 degrees. 2. Then push your heels into the floor, squeeze your buttocks, and lift your hips off the floor until your shoulders, hips, and knees are all in a straight line. 3. Hold for about 6 seconds as you continue to breathe normally, and then slowly lower your hips back down to the floor and rest for up to 10 seconds. 4. Do 8 to 12 repetitions. Hamstring curls   1. Lie on your stomach with your knees straight. If your kneecap is uncomfortable, roll up a washcloth and put it under your leg just above your kneecap. 2. Lift the foot of your injured leg by bending the knee so that you bring the foot up toward your buttock. If this motion hurts, try it without bending your knee quite as far. This may help you avoid any painful motion. 3. Slowly lower your leg back to the floor. 4. Do 8 to 12 repetitions.   5. With permission from your doctor or physical therapist, you may also want to add a cuff weight to your ankle (not more than 5 pounds). With weight, you do not have to lift your leg more than 12 inches to get a hamstring workout. Shallow standing knee bends   1. Stand with your hands lightly resting on a counter or chair in front of you. Put your feet shoulder-width apart. 2. Slowly bend your knees so that you squat down like you are going to sit in a chair. Make sure your knees do not go in front of your toes. 3. Lower yourself about 6 inches. Your heels should remain on the floor at all times. 4. Rise slowly to a standing position. Heel raises   1. Stand with your feet a few inches apart, with your hands lightly resting on a counter or chair in front of you. 2. Slowly raise your heels off the floor while keeping your knees straight. 3. Hold for about 6 seconds, then slowly lower your heels to the floor. 4. Do 8 to 12 repetitions several times during the day. Follow-up care is a key part of your treatment and safety. Be sure to make and go to all appointments, and call your doctor if you are having problems. It's also a good idea to know your test results and keep a list of the medicines you take. Where can you learn more? Go to http://agus-john.info/  Enter L559 in the search box to learn more about \"Knee: Exercises. \"  Current as of: June 26, 2019Content Version: 12.4  © 7643-0849 [employer]. Care instructions adapted under license by [employer] (which disclaims liability or warranty for this information). If you have questions about a medical condition or this instruction, always ask your healthcare professional. [place] [address] any warranty or liability for your use of this information.

## 2020-04-21 NOTE — PROGRESS NOTES
Consent: Adam Javier, who was seen by synchronous (real-time) audio-video technology, and/or his healthcare decision maker, is aware that this patient-initiated, Telehealth encounter on 4/21/2020 is a billable service, with coverage as determined by his insurance carrier. He is aware that he may receive a bill and has provided verbal consent to proceed: Yes. Subjective:   Adam Javier is a 64 y.o. male who was seen for No chief complaint on file. Left knee pain x 2 weeks    Pt has not been seen here since 2017, says he is following care with the [place]. Left knee pain- medial and top of knee pressure sensation, worse when he walks. Started 2 weeks ago No trauma, redness or swelling, but tender to touch. Denies fever. Says he consulted the [place] and was told to start ibuprofen 400 mg bid, without much relief. DM- he reports be taking metformin 500 mg bid, he says a1c is slightly creeping up last one checked was 7. 2. he says weight is 294 lbs. His diet is inconsistent. He is interested in having these tests done by me. HTN- reports BP readings 130/70's. He is on  Lisinopril, hctz. He has known JANELLE and reports to be using cpap regularly. HL- he is taking lipitor 20 mg qhs    He mentions low testosterone levels and was told monitoring at this time and no indication for T replacement. He is requesting for this to be checked    Prior to Admission medications    Medication Sig Start Date End Date Taking? Authorizing Provider   metFORMIN (GLUCOPHAGE) 500 mg tablet Take 500 mg by mouth two (2) times a day. 7/18/17  Yes Provider, Historical   atorvastatin (LIPITOR) 20 mg tablet Take 1 Tab by mouth daily. 7/10/17  Yes Joan Shay MD   hydroCHLOROthiazide (HYDRODIURIL) 25 mg tablet Take 1 Tab by mouth daily. 7/10/17  Yes Joan Shay MD   lisinopril (PRINIVIL, ZESTRIL) 40 mg tablet Take 1 Tab by mouth daily.  7/10/17  Yes Joan Shay MD   aspirin delayed-release 81 mg tablet Take 1 Tab by mouth daily. 7/10/17  Yes Kenya Joseph MD   glucose blood VI test strips (PRECISION XTRA TEST) strip Check fasting glucose once daily 8/19/16  Yes Marcie Leos MD   cholecalciferol, VITAMIN D3, (VITAMIN D3) 5,000 unit tab tablet Take 10,000 Units by mouth daily. Yes Provider, Historical   pantoprazole (PROTONIX) 20 mg tablet Take 1 Tab by mouth daily. 7/10/17 4/21/20  Kenya Joseph MD   TANZEUM 30 mg/0.5 mL pnij  8/15/16 4/21/20  Provider, Historical   metFORMIN (GLUCOPHAGE) 1,000 mg tablet Take 1 Tab by mouth two (2) times daily (with meals). 5/5/16 4/21/20  Kenya Joseph MD   omega-3 fatty acids-vitamin e (FISH OIL) 1,000 mg cap Take 2 Caps by mouth two (2) times a day. 4/21/20  Provider, Historical   zinc 50 mg tab tablet Take 50 mg by mouth daily. 4/21/20  Provider, Historical   magnesium oxide (MAG-OX) 400 mg tablet Take 400 mg by mouth daily. 4/21/20  Provider, Historical   POTASSIUM CHLORIDE Take 595 mg by mouth daily. 4/21/20  Provider, Historical   VITAMIN E, DL,TOCOPHERYL ACET, (VITAMIN E ACETATE) 400 unit cap capsule Take 400 Units by mouth daily. 4/21/20  Provider, Historical     No Known Allergies    Patient Active Problem List    Diagnosis Date Noted    Hx of colonoscopy-update 2022 dr Deyanira Faith 04/03/2017    Gastroesophageal reflux disease 08/19/2016    Type 2 [facility] mellitus without complication [facility] 37/43/0785    VALDES (nonalcoholic steatohepatitis) 10/01/2015    Essential hypertension 10/01/2015    Hyperlipidemia 10/01/2015    JANELLE on CPAP 10/01/2015    BMI 40.0-44.9, adult ([facility] 10/01/2015     Current Outpatient Medications   Medication Sig Dispense Refill    metFORMIN (GLUCOPHAGE) 500 mg tablet Take 500 mg by mouth two (2) times a day.  atorvastatin (LIPITOR) 20 mg tablet Take 1 Tab by mouth daily. 90 Tab 3    hydroCHLOROthiazide (HYDRODIURIL) 25 mg tablet Take 1 Tab by mouth daily.  90 Tab 3    lisinopril (PRINIVIL, ZESTRIL) 40 mg tablet Take 1 Tab by mouth daily. 90 Tab 3    aspirin delayed-release 81 mg tablet Take 1 Tab by mouth daily. 90 Tab 3    glucose blood VI test strips (PRECISION XTRA TEST) strip Check fasting glucose once daily 100 Strip 11    cholecalciferol, VITAMIN D3, (VITAMIN D3) 5,000 unit tab tablet Take 10,000 Units by mouth daily. No Known Allergies  Past Medical History:   Diagnosis Date    Agatston CAC score, <100 12/09/2015    Coronary calcium score 0.    Diabetes ([address]     Dyslipidemia     Echocardiogram 11/09/2015    EF 60%. No WMA. Mild LVH. Normal LV diastolic fx. Mild RVE.  GERD (gastroesophageal reflux disease)     Hx of colonoscopy 02/15/2017    repeat 5  yrs 2022, dr. Freddy Kim Hypertension     VALDES (nonalcoholic steatohepatitis)     Nuclear cardiac imaging test 11/09/2015    Low risk. No ischemia or infarction by perfusion imaging. EF 61%. No RWMA. Likely false positive EKG chgs on max EST. No chest pain. Ex time 9 mins 15 secs.  JANELLE on CPAP      Past Surgical History:   Procedure Laterality Date    HX COLONOSCOPY  02/14/2017    POLYPECTOMY/ DR. Reagan Harrison REPEAT IN 5 YEARS 2022     Family History   Problem Relation Age of Onset    Hypertension Mother     Elevated Lipids Mother     Diabetes Mother     Cancer Father         Prostate     Social History     Tobacco Use    Smoking status: Never Smoker    Smokeless tobacco: Never Used   Substance Use Topics    Alcohol use: Yes     Comment: 4/week       ROS      Objective: There were no vitals taken for this visit.    General: alert, cooperative, no distress   Mental  status: normal mood, behavior, speech, dress, motor activity, and thought processes, able to follow commands   HENT: NCAT   Neck: no visualized mass   Resp: no respiratory distress   Neuro: no gross deficits   Skin: no discoloration or lesions of concern on visible areas   Psychiatric: normal affect, consistent with stated mood, no evidence of hallucinations Additional exam findings:   L knee extension, flexion with FROM   no swelling    We discussed the expected course, resolution and complications of the diagnosis(es) in detail. Medication risks, benefits, costs, interactions, and alternatives were discussed as indicated. I advised him to contact the office if his condition worsens, changes or fails to improve as anticipated. He expressed understanding with the diagnosis(es) and plan. Kady Trevizo is a 64 y.o. male being evaluated by a video visit encounter for concerns as above. A caregiver was present when appropriate. Due to this being a TeleHealth encounter (During VYLJX-42 public health emergency), evaluation of the following organ systems was limited: Vitals/Constitutional/EENT/Resp/CV/GI//MS/Neuro/Skin/Heme-Lymph-Imm. Pursuant to the emergency declaration under the [facility] waiver authority and the [employer] Act, this Virtual  Visit was conducted, with patient's (and/or legal guardian's) consent, to reduce the patient's risk of exposure to COVID-19 and provide necessary medical care. Services were provided through a video synchronous discussion virtually to substitute for in-person clinic visit. Patient and provider were located at their individual homes. Diagnoses and all orders for this visit:    1. Acute pain of left knee  OA vs bursitis  Start ibuprofen 800 mg tid rtc x 3 days then prn  -     XR KNEE LT MIN 4 V; Future  -     CBC WITH AUTOMATED DIFF; Future  Offered ortho consult, pt wants to hold off    2. Low testosterone in male  H/o  -     TESTOSTERONE, FREE & TOTAL; Future    3. Essential hypertension  Per pt controlled  On lisinopril, hctz    4. Pure hypercholesterolemia  ldl goal <100  On lipitor 20 mg qhs  -     METABOLIC PANEL, COMPREHENSIVE; Future  -     LIPID PANEL; Future    5.  Type 2 diabetes mellitus without complication, without long-term current use of insulin (HCC)  Control unknown   Currently on metformin 500 mg bid  -     HEMOGLOBIN A1C W/O EAG; Future  -     METABOLIC PANEL, COMPREHENSIVE; Future  -     LIPID PANEL; Future  -     MICROALBUMIN, UR, RAND W/ MICROALB/CREAT RATIO; Future    6.  JANELLE on CPAP  Advised consistent CPAP Use  Discussed weight loss    Ff-up in 2 weeks VV      Samantha Torres MD

## 2020-04-30 ENCOUNTER — HOSPITAL ENCOUNTER (OUTPATIENT)
Dept: LAB | Age: 60
Discharge: HOME | End: 2020-04-30
Payer: OTHER GOVERNMENT

## 2020-04-30 ENCOUNTER — HOSPITAL ENCOUNTER (OUTPATIENT)
Dept: GENERAL RADIOLOGY | Age: 60
Discharge: HOME | End: 2020-04-30
Payer: OTHER GOVERNMENT

## 2020-04-30 DIAGNOSIS — M25.562 ACUTE PAIN OF LEFT KNEE: ICD-10-CM

## 2020-04-30 DIAGNOSIS — E11.9 TYPE 2 DIABETES MELLITUS WITHOUT COMPLICATION, WITHOUT LONG-TERM CURRENT USE OF INSULIN (HCC): ICD-10-CM

## 2020-04-30 DIAGNOSIS — R79.89 LOW TESTOSTERONE IN MALE: ICD-10-CM

## 2020-04-30 DIAGNOSIS — E78.00 PURE HYPERCHOLESTEROLEMIA: ICD-10-CM

## 2020-04-30 LAB
ALBUMIN SERPL-MCNC: 4.2 G/DL (ref 3.4–5)
ALBUMIN/GLOB SERPL: 1.4 {RATIO} (ref 0.8–1.7)
ALP SERPL-CCNC: 48 U/L (ref 45–117)
ALT SERPL-CCNC: 53 U/L (ref 16–61)
ANION GAP SERPL CALC-SCNC: 7 MMOL/L (ref 3–18)
AST SERPL-CCNC: 28 U/L (ref 10–38)
BASOPHILS # BLD: 0 K/UL (ref 0–0.1)
BASOPHILS NFR BLD: 1 % (ref 0–2)
BILIRUB SERPL-MCNC: 0.6 MG/DL (ref 0.2–1)
BUN SERPL-MCNC: 15 MG/DL (ref 7–18)
BUN/CREAT SERPL: 16 (ref 12–20)
CALCIUM SERPL-MCNC: 8.8 MG/DL (ref 8.5–10.1)
CHLORIDE SERPL-SCNC: 101 MMOL/L (ref 100–111)
CHOLEST SERPL-MCNC: 169 MG/DL
CO2 SERPL-SCNC: 30 MMOL/L (ref 21–32)
CREAT SERPL-MCNC: 0.92 MG/DL (ref 0.6–1.3)
CREAT UR-MCNC: 65 MG/DL (ref 30–125)
DIFFERENTIAL METHOD BLD: ABNORMAL
EOSINOPHIL # BLD: 0.1 K/UL (ref 0–0.4)
EOSINOPHIL NFR BLD: 2 % (ref 0–5)
ERYTHROCYTE [DISTWIDTH] IN BLOOD BY AUTOMATED COUNT: 13.2 % (ref 11.6–14.5)
GLOBULIN SER CALC-MCNC: 3.1 G/DL (ref 2–4)
GLUCOSE SERPL-MCNC: 169 MG/DL (ref 74–99)
HBA1C MFR BLD: 7.3 % (ref 4.2–5.6)
HCT VFR BLD AUTO: 47 % (ref 36–48)
HDLC SERPL-MCNC: 50 MG/DL (ref 40–60)
HDLC SERPL: 3.4 {RATIO} (ref 0–5)
HGB BLD-MCNC: 15.7 G/DL (ref 13–16)
LDLC SERPL CALC-MCNC: 95.4 MG/DL (ref 0–100)
LIPID PROFILE,FLP: NORMAL
LYMPHOCYTES # BLD: 1.9 K/UL (ref 0.9–3.6)
LYMPHOCYTES NFR BLD: 34 % (ref 21–52)
MCH RBC QN AUTO: 31.8 PG (ref 24–34)
MCHC RBC AUTO-ENTMCNC: 33.4 G/DL (ref 31–37)
MCV RBC AUTO: 95.3 FL (ref 74–97)
MICROALBUMIN UR-MCNC: 0.64 MG/DL (ref 0–3)
MICROALBUMIN/CREAT UR-RTO: 10 MG/G (ref 0–30)
MONOCYTES # BLD: 0.7 K/UL (ref 0.05–1.2)
MONOCYTES NFR BLD: 12 % (ref 3–10)
NEUTS SEG # BLD: 2.9 K/UL (ref 1.8–8)
NEUTS SEG NFR BLD: 51 % (ref 40–73)
PLATELET # BLD AUTO: 213 K/UL (ref 135–420)
PMV BLD AUTO: 10.1 FL (ref 9.2–11.8)
POTASSIUM SERPL-SCNC: 4.3 MMOL/L (ref 3.5–5.5)
PROT SERPL-MCNC: 7.3 G/DL (ref 6.4–8.2)
RBC # BLD AUTO: 4.93 M/UL (ref 4.7–5.5)
SODIUM SERPL-SCNC: 138 MMOL/L (ref 136–145)
TRIGL SERPL-MCNC: 118 MG/DL (ref ?–150)
VLDLC SERPL CALC-MCNC: 23.6 MG/DL
WBC # BLD AUTO: 5.6 K/UL (ref 4.6–13.2)

## 2020-04-30 PROCEDURE — 80053 COMPREHEN METABOLIC PANEL: CPT

## 2020-04-30 PROCEDURE — 82043 UR ALBUMIN QUANTITATIVE: CPT

## 2020-04-30 PROCEDURE — 85025 COMPLETE CBC W/AUTO DIFF WBC: CPT

## 2020-04-30 PROCEDURE — 73564 X-RAY EXAM KNEE 4 OR MORE: CPT

## 2020-04-30 PROCEDURE — 36415 COLL VENOUS BLD VENIPUNCTURE: CPT

## 2020-04-30 PROCEDURE — 83036 HEMOGLOBIN GLYCOSYLATED A1C: CPT

## 2020-04-30 PROCEDURE — 84403 ASSAY OF TOTAL TESTOSTERONE: CPT

## 2020-04-30 PROCEDURE — 80061 LIPID PANEL: CPT

## 2020-05-01 LAB
TESTOST FREE SERPL-MCNC: 13 PG/ML (ref 7.2–24)
TESTOST SERPL-MCNC: 268 NG/DL (ref 264–916)

## 2020-05-05 ENCOUNTER — VIRTUAL VISIT (OUTPATIENT)
Dept: FAMILY MEDICINE CLINIC | Age: 60
End: 2020-05-05

## 2020-05-05 DIAGNOSIS — Z99.89 OSA ON CPAP: ICD-10-CM

## 2020-05-05 DIAGNOSIS — G47.33 OSA ON CPAP: ICD-10-CM

## 2020-05-05 DIAGNOSIS — M25.562 ACUTE PAIN OF LEFT KNEE: Primary | ICD-10-CM

## 2020-05-05 DIAGNOSIS — E78.00 PURE HYPERCHOLESTEROLEMIA: ICD-10-CM

## 2020-05-05 DIAGNOSIS — K21.9 GASTROESOPHAGEAL REFLUX DISEASE, ESOPHAGITIS PRESENCE NOT SPECIFIED: ICD-10-CM

## 2020-05-05 DIAGNOSIS — E11.9 TYPE 2 DIABETES MELLITUS WITHOUT COMPLICATION, WITHOUT LONG-TERM CURRENT USE OF INSULIN (HCC): ICD-10-CM

## 2020-05-05 DIAGNOSIS — I10 ESSENTIAL HYPERTENSION: ICD-10-CM

## 2020-05-05 NOTE — PATIENT INSTRUCTIONS

## 2020-05-05 NOTE — PROGRESS NOTES
Hortensia Kayser. is a 64 y.o. male who was seen by synchronous (real-time) audio-video technology on 5/5/2020. Consent: Hortensia Kayser., who was seen by synchronous (real-time) audio-video technology, and/or his healthcare decision maker, is aware that this patient-initiated, Telehealth encounter on 5/5/2020 is a billable service, with coverage as determined by his insurance carrier. He is aware that he may receive a bill and has provided verbal consent to proceed: Yes. 712  Subjective:   Hortensia Kayser. is a 64 y.o. male who was seen for No chief complaint on file. ff-up       Left knee pain-reports no improvement with nsaids. Xray showing mild to moderate effusion. He denies redness or fever, worse with weight bearing.      DM- he reports be taking metformin 500 mg bidl, was on weekly glp1 few years ago. Labs reviewed a1c 7.3 . He is interested in seeing DM educator, diet is inconsistent. HTN- reports BP readings 130/70's. He is on  Lisinopril, hctz. He has known JANELLE and reports to be using cpap regularly.     HL- he is taking lipitor 20 mg qhs    Reviewed testosterone levels wnl    Prior to Admission medications    Medication Sig Start Date End Date Taking? Authorizing Provider   empagliflozin (Jardiance) 10 mg tablet Take 1 Tab by mouth daily. 5/5/20  Yes Savannah Mason MD   metFORMIN (GLUCOPHAGE) 500 mg tablet Take 500 mg by mouth two (2) times a day. 7/18/17  Yes Provider, Historical   atorvastatin (LIPITOR) 20 mg tablet Take 1 Tab by mouth daily. 7/10/17  Yes Savannah Mason MD   hydroCHLOROthiazide (HYDRODIURIL) 25 mg tablet Take 1 Tab by mouth daily. 7/10/17  Yes Savannah Mason MD   lisinopril (PRINIVIL, ZESTRIL) 40 mg tablet Take 1 Tab by mouth daily. 7/10/17  Yes Savannah Mason MD   aspirin delayed-release 81 mg tablet Take 1 Tab by mouth daily.  7/10/17  Yes Savannah Mason MD   glucose blood VI test strips (PRECISION XTRA TEST) strip Check fasting glucose once daily 8/19/16  Yes Izabella George MD   cholecalciferol, VITAMIN D3, (VITAMIN D3) 5,000 unit tab tablet Take 10,000 Units by mouth daily. Yes Provider, Historical     No Known Allergies    Patient Active Problem List    Diagnosis Date Noted    Hx of colonoscopy-update 2022 dr Shaan Jesus 04/03/2017    Gastroesophageal reflux disease 08/19/2016    Type 2 diabetes mellitus without complication ([facility] 11/01/7412    VALDES (nonalcoholic steatohepatitis) 10/01/2015    Essential hypertension 10/01/2015    Hyperlipidemia 10/01/2015    JANELLE on CPAP 10/01/2015    BMI 40.0-44.9, adult ([facility] 10/01/2015     Current Outpatient Medications   Medication Sig Dispense Refill    empagliflozin (Jardiance) 10 mg tablet Take 1 Tab by mouth daily. 90 Tab 0    metFORMIN (GLUCOPHAGE) 500 mg tablet Take 500 mg by mouth two (2) times a day.  atorvastatin (LIPITOR) 20 mg tablet Take 1 Tab by mouth daily. 90 Tab 3    hydroCHLOROthiazide (HYDRODIURIL) 25 mg tablet Take 1 Tab by mouth daily. 90 Tab 3    lisinopril (PRINIVIL, ZESTRIL) 40 mg tablet Take 1 Tab by mouth daily. 90 Tab 3    aspirin delayed-release 81 mg tablet Take 1 Tab by mouth daily. 90 Tab 3    glucose blood VI test strips (PRECISION XTRA TEST) strip Check fasting glucose once daily 100 Strip 11    cholecalciferol, VITAMIN D3, (VITAMIN D3) 5,000 unit tab tablet Take 10,000 Units by mouth daily. No Known Allergies  Past Medical History:   Diagnosis Date    Agatston CAC score, <100 12/09/2015    Coronary calcium score 0.    Diabetes ([facility]     Dyslipidemia     Echocardiogram 11/09/2015    EF 60%. No WMA. Mild LVH. Normal LV diastolic fx. Mild RVE.  GERD (gastroesophageal reflux disease)     Hx of colonoscopy 02/15/2017    repeat 5  yrs 2022, dr. Tess Hill Hypertension     VALDES (nonalcoholic steatohepatitis)     Nuclear cardiac imaging test 11/09/2015    Low risk. No ischemia or infarction by perfusion imaging. EF 61%. No RWMA. Likely false positive EKG chgs on max EST. No chest pain. Ex time 9 mins 15 secs.  JANELLE on CPAP      Past Surgical History:   Procedure Laterality Date    HX COLONOSCOPY  02/14/2017    POLYPECTOMY/ DR. Patterson British REPEAT IN 5 YEARS 2022     Family History   Problem Relation Age of Onset    Hypertension Mother     Elevated Lipids Mother     Diabetes Mother     Cancer Father         Prostate     Social History     Tobacco Use    Smoking status: Never Smoker    Smokeless tobacco: Never Used   Substance Use Topics    Alcohol use: Yes     Comment: 4/week       ROS      Objective: There were no vitals taken for this visit. General: alert, cooperative, no distress   Mental  status: normal mood, behavior, speech, dress, motor activity, and thought processes, able to follow commands   HENT: NCAT   Neck: no visualized mass   Resp: no respiratory distress   Neuro: no gross deficits   Skin: no discoloration or lesions of concern on visible areas   Psychiatric: normal affect, consistent with stated mood, no evidence of hallucinations     Additional exam findings:     Results for orders placed or performed during the hospital encounter of 04/30/20   HEMOGLOBIN A1C W/O EAG   Result Value Ref Range    Hemoglobin A1c 7.3 (H) 4.2 - 5.6 %   METABOLIC PANEL, COMPREHENSIVE   Result Value Ref Range    Sodium 138 136 - 145 mmol/L    Potassium 4.3 3.5 - 5.5 mmol/L    Chloride 101 100 - 111 mmol/L    CO2 30 21 - 32 mmol/L    Anion gap 7 3.0 - 18 mmol/L    Glucose 169 (H) 74 - 99 mg/dL    BUN 15 7.0 - 18 MG/DL    Creatinine 0.92 0.6 - 1.3 MG/DL    BUN/Creatinine ratio 16 12 - 20      GFR est AA >60 >60 ml/min/1.73m2    GFR est non-AA >60 >60 ml/min/1.73m2    Calcium 8.8 8.5 - 10.1 MG/DL    Bilirubin, total 0.6 0.2 - 1.0 MG/DL    ALT (SGPT) 53 16 - 61 U/L    AST (SGOT) 28 10 - 38 U/L    Alk.  phosphatase 48 45 - 117 U/L    Protein, total 7.3 6.4 - 8.2 g/dL    Albumin 4.2 3.4 - 5.0 g/dL    Globulin 3.1 2.0 - 4.0 g/dL    A-G Ratio 1.4 0.8 - 1.7     LIPID PANEL   Result Value Ref Range    LIPID PROFILE          Cholesterol, total 169 <200 MG/DL    Triglyceride 118 <150 MG/DL    HDL Cholesterol 50 40 - 60 MG/DL    LDL, calculated 95.4 0 - 100 MG/DL    VLDL, calculated 23.6 MG/DL    CHOL/HDL Ratio 3.4 0 - 5.0     MICROALBUMIN, UR, RAND W/ MICROALB/CREAT RATIO   Result Value Ref Range    Microalbumin,urine random 0.64 0 - 3.0 MG/DL    Creatinine, urine 65.00 30 - 125 mg/dL    Microalbumin/Creat ratio (mg/g creat) 10 0 - 30 mg/g   CBC WITH AUTOMATED DIFF   Result Value Ref Range    WBC 5.6 4.6 - 13.2 K/uL    RBC 4.93 4.70 - 5.50 M/uL    HGB 15.7 13.0 - 16.0 g/dL    HCT 47.0 36.0 - 48.0 %    MCV 95.3 74.0 - 97.0 FL    MCH 31.8 24.0 - 34.0 PG    MCHC 33.4 31.0 - 37.0 g/dL    RDW 13.2 11.6 - 14.5 %    PLATELET 112 557 - 826 K/uL    MPV 10.1 9.2 - 11.8 FL    NEUTROPHILS 51 40 - 73 %    LYMPHOCYTES 34 21 - 52 %    MONOCYTES 12 (H) 3 - 10 %    EOSINOPHILS 2 0 - 5 %    BASOPHILS 1 0 - 2 %    ABS. NEUTROPHILS 2.9 1.8 - 8.0 K/UL    ABS. LYMPHOCYTES 1.9 0.9 - 3.6 K/UL    ABS. MONOCYTES 0.7 0.05 - 1.2 K/UL    ABS. EOSINOPHILS 0.1 0.0 - 0.4 K/UL    ABS. BASOPHILS 0.0 0.0 - 0.1 K/UL    DF AUTOMATED     TESTOSTERONE, FREE & TOTAL   Result Value Ref Range    Testosterone 268 264 - 916 ng/dL    Free testosterone (Direct) 13.0 7.2 - 24.0 pg/mL       We discussed the expected course, resolution and complications of the diagnosis(es) in detail. Medication risks, benefits, costs, interactions, and alternatives were discussed as indicated. I advised him to contact the office if his condition worsens, changes or fails to improve as anticipated. He expressed understanding with the diagnosis(es) and plan. Ankur Londono is a 64 y.o. male who was evaluated by a video visit encounter for concerns as above. Patient identification was verified prior to start of the visit. A caregiver was present when appropriate.  Due to this being a TeleHealth encounter (During EFSST-34 public health emergency), evaluation of the following organ systems was limited: Vitals/Constitutional/EENT/Resp/CV/GI//MS/Neuro/Skin/Heme-Lymph-Imm. Pursuant to the emergency declaration under the [facility] waiver authority and the [employer] Act, this Virtual  Visit was conducted, with patient's (and/or legal guardian's) consent, to reduce the patient's risk of exposure to COVID-19 and provide necessary medical care. Services were provided through a video synchronous discussion virtually to substitute for in-person clinic visit. Patient and provider were located at their individual homes. Assessment & Plan:   Diagnoses and all orders for this visit:    Acute pain of left knee  Persistent  I would hold back on giving po steroids with his DM  Offered topical nsaid pt declines  -     REFERRAL TO ORTHOPEDICS    Type 2 diabetes mellitus without complication, without long-term current use of insulin (HCC)  Uncontrolled  a1c 7.3  Cont metformin  Discussed options, agreed to add SGLT2 for now, if not covered weekly glp1  Cont FBG checks  ADA Diet  Refer to DM educator/pharm D  -     HEMOGLOBIN A1C W/O EAG; Future  -     METABOLIC PANEL, COMPREHENSIVE; Future     Essential hypertension  Cont lisinopril hctz     JANELLE on CPAP  Advised consistent use    Pure hypercholesterolemia  LDL goal <100  At goal, cont lipitor    Other orders  -     empagliflozin (Jardiance) 10 mg tablet; Take 1 Tab by mouth daily.     Ff-up in 6 weeks with FBG log  Ff-up in 3 months, labs prior      Sera Lawrence MD

## 2020-05-08 ENCOUNTER — TELEPHONE (OUTPATIENT)
Dept: FAMILY MEDICINE CLINIC | Age: 60
End: 2020-05-08

## 2020-05-08 NOTE — TELEPHONE ENCOUNTER
The drug you are requesting prior authorization for is not covered. Please select an alternative drug from the choices provided and send in a new prescription for that drug. You may also select \"Complete PA\" to obtain a PA for the originally requested drug.    JANUVIA TABS 25MG   JANUVIA TABS 50MG   JANUVIA TABS 100MG   JANUMET TABS 50/500M   JANUMET TABS 50/1000   JANUMET XR TABS 50/500M   JANUMET XR TABS 50/1000   GLYBURIDE TABS 1.25MG   Complete PA  Please advise if medication can be changed to one above or do prior Larisa

## 2020-05-08 NOTE — TELEPHONE ENCOUNTER
Pt states that the pharmacy is requesting a prior auth for  Zeb Seo stating that he needs to use metformin first. Pt states that he has already used Metformin . Please advise.

## 2020-05-08 NOTE — TELEPHONE ENCOUNTER
Left detailed message for patient that insurance is not covering Jardaince even with the use of metformin. Dr. Tamanna Cartwright has changed medication to [facility] and e scribed to pharmacy.

## 2020-05-11 ENCOUNTER — TELEPHONE (OUTPATIENT)
Dept: FAMILY MEDICINE CLINIC | Age: 60
End: 2020-05-11

## 2020-05-11 NOTE — TELEPHONE ENCOUNTER
Fax received from [address] [place] [address] meds stating prior auth is required for the Januvia 100 MG Tab. Submit a PA request  1. Go to key. [employer] and click \"Enter a Key\"  2. Patient last name: Gaston Campoverde      : 1960      Key: [id number]  3.  Click \"start a PA\", complete the form, and \"send to plan\"

## 2020-05-11 NOTE — TELEPHONE ENCOUNTER
Pt states that [facility] has denied the second RX of Januvia so he would like to know what needs to be done now. Please advise. So I guess now [facility] needs a Prior auth for the Januvia.

## 2020-05-12 NOTE — TELEPHONE ENCOUNTER
Your request has been approved via CoverMyMeds  CaseId:[id number];Status:Approved; Review Type:Prior Auth; Coverage Start Date:04/12/2020; Coverage End Date:12/31/2099;  Patient aware of approval  [facility] aware of approval.

## 2020-05-19 NOTE — TELEPHONE ENCOUNTER
This pharmacy faxed over request for the following prescriptions to be filled:    Medication requested :   Requested Prescriptions     Pending Prescriptions Disp Refills    SITagliptin (JANUVIA) 100 mg tablet 90 Tab 0     Sig: Take 1 Tab by mouth daily. PCP: [address] [place] [address] or Print: [facility]   Mail order or Local pharmacy Mail Order     Scheduled appointment if not seen by current providers in office:  [address] [place] [address] 5/5/2020 No f/u up Scheduled at this time.   Due 8/5/2020

## 2020-06-08 ENCOUNTER — OFFICE VISIT (OUTPATIENT)
Dept: ORTHOPEDIC SURGERY | Facility: CLINIC | Age: 60
End: 2020-06-08

## 2020-06-08 VITALS
DIASTOLIC BLOOD PRESSURE: 76 MMHG | TEMPERATURE: 97.2 F | SYSTOLIC BLOOD PRESSURE: 130 MMHG | BODY MASS INDEX: 40.68 KG/M2 | HEART RATE: 83 BPM | WEIGHT: 290.6 LBS | HEIGHT: 71 IN

## 2020-06-08 DIAGNOSIS — M25.562 CHRONIC PAIN OF LEFT KNEE: ICD-10-CM

## 2020-06-08 DIAGNOSIS — G89.29 CHRONIC PAIN OF LEFT KNEE: ICD-10-CM

## 2020-06-08 DIAGNOSIS — M17.12 PRIMARY OSTEOARTHRITIS OF LEFT KNEE: Primary | ICD-10-CM

## 2020-06-08 RX ORDER — BETAMETHASONE SODIUM PHOSPHATE AND BETAMETHASONE ACETATE 3; 3 MG/ML; MG/ML
6 INJECTION, SUSPENSION INTRA-ARTICULAR; INTRALESIONAL; INTRAMUSCULAR; SOFT TISSUE ONCE
Qty: 0.5 ML | Refills: 0
Start: 2020-06-08 | End: 2020-06-08

## 2020-06-08 NOTE — PROGRESS NOTES
Patient: Ankur Ross. MRN: [id number]       SSN: [id number]  YOB: 1960        AGE: 64 y.o. SEX: male    PCP: Jessica Post MD  06/08/20    Chief Complaint   Patient presents with    Knee Pain     HISTORY:  Ankur Londono is a 64 y.o. male who is seen for left kne pain. He has been experiencing left knee pain for the past several months. He first noticed his pain when his dogs started to pull while walking on uneven terrain at a farm near his home in [place], [place]. His dogs took off after a squirrel. He feels mostly medial knee pain. He describes the pain as achy and burning. He notes pain with standing, walking and stair climbing. He experiences startup pain after sitting. His pain wakes him up at night and he has trouble finding a comfortable sleeping position. He has discomfort while driving. He takes aspirin with little relief. 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 He has a h/o over 2500 jumps. He has pain after running a long distance on a treadmill. Pain Assessment  6/8/2020   Location of Pain Knee   Location Modifiers Left   Severity of Pain 6   Quality of Pain Aching;Dull   Duration of Pain Persistent   Frequency of Pain Constant   Aggravating Factors Walking;Standing;Bending;Stretching;Straightening;Exercise;Kneeling;Squatting;Stairs   Limiting Behavior Yes   Relieving Factors NSAID   Result of Injury No     Occupation, etc:  Mr. Johnnie Rodriguez he is a  at [employer]. He served 21 years in the [employer] and retired in 2001. He lives in [place], [place] with his wife. He has two standard poodles-- Brie and [place]-- that like to mendy after squirrels and rabbits. He has 1 adult son and 1 adult daughter. He uses a treadmill at home--runs about half a mile. He recently lost 4 pounds. Mr. Johnnie Rodriguez weighs 290 lbs and is 5'11\" tall.        Lab Results   Component Value Date/Time Hemoglobin A1c 7.3 (H) 04/30/2020 07:12 AM    Hemoglobin A1c (POC) 6.1 05/05/2016 08:13 AM    Hemoglobin A1c, External 6.1 12/20/2017     Weight Metrics 6/8/2020 9/5/2017 7/10/2017 4/3/2017 12/28/2016 8/19/2016 5/5/2016   Weight 290 lb 9.6 oz 298 lb 292 lb 289 lb 292 lb 294 lb 292 lb   BMI 40.53 kg/m2 41.56 kg/m2 40.73 kg/m2 40.31 kg/m2 40.73 kg/m2 41 kg/m2 40.74 kg/m2       Patient Active Problem List   Diagnosis Code    VALDES (nonalcoholic steatohepatitis) K75.81    Essential hypertension I10    Hyperlipidemia E78.5    JANELLE on CPAP G47.33, Z99.89    BMI 40.0-44.9, adult ([facility] Z68.41    Type 2 diabetes mellitus without complication (HCC) E98.1    Gastroesophageal reflux disease K21.9    Hx of colonoscopy-update 2022 dr Roopa Pena Z98.890     REVIEW OF SYSTEMS:    Constitutional Symptoms: Negative   Eyes: Negative   Ears, Nose, Throat and Mouth: Negative   Cardiovascular: Negative   Respiratory: Negative   Genitourinary: Per HPI   Gastrointestinal: Per HPI   Integumentary (Skin and/or Breast): Negative   Musculoskeletal: Per HPI   Endocrine/Rheumatologic: Negative   Neurological: Per HPI   Hematology/Lymphatic: Negative    Allergic/Immunologic: Negative   Phychiatric: Negative    Social History     Socioeconomic History    Marital status:      Spouse name: Not on file    Number of children: Not on file    Years of education: Not on file    Highest education level: Not on file   Occupational History    Not on file   Social Needs    Financial resource strain: Not on file    Food insecurity     Worry: Not on file     Inability: Not on file   [employer] needs     Medical: Not on file     Non-medical: Not on file   Tobacco Use    Smoking status: Never Smoker    Smokeless tobacco: Never Used   Substance and Sexual Activity    Alcohol use: Yes     Comment: 4/week    Drug use: No    Sexual activity: Yes     Partners: Female   Lifestyle    Physical activity     Days per week: Not on file Minutes per session: Not on file    Stress: Not on file   Relationships    Social connections     Talks on phone: Not on file     Gets together: Not on file     Attends Scientologist service: Not on file     Active member of club or organization: Not on file     Attends meetings of clubs or organizations: Not on file     Relationship status: Not on file    Intimate partner violence     Fear of current or ex partner: Not on file     Emotionally abused: Not on file     Physically abused: Not on file     Forced sexual activity: Not on file   Other Topics Concern    Not on file   Social History Narrative    Not on file      No Known Allergies   Current Outpatient Medications   Medication Sig    empagliflozin (Jardiance) 10 mg tablet Take 1 Tab by mouth daily.  metFORMIN (GLUCOPHAGE) 500 mg tablet Take 500 mg by mouth two (2) times a day.  atorvastatin (LIPITOR) 20 mg tablet Take 1 Tab by mouth daily.  hydroCHLOROthiazide (HYDRODIURIL) 25 mg tablet Take 1 Tab by mouth daily.  lisinopril (PRINIVIL, ZESTRIL) 40 mg tablet Take 1 Tab by mouth daily.  aspirin delayed-release 81 mg tablet Take 1 Tab by mouth daily.  glucose blood VI test strips (PRECISION XTRA TEST) strip Check fasting glucose once daily    SITagliptin (JANUVIA) 100 mg tablet Take 1 Tab by mouth daily.  cholecalciferol, VITAMIN D3, (VITAMIN D3) 5,000 unit tab tablet Take 10,000 Units by mouth daily. No current facility-administered medications for this visit.        PHYSICAL EXAMINATION:  Visit Vitals  /76   Pulse 83   Temp 97.2 °F (36.2 °C)   Ht 5' 11\" (1.803 m)   Wt 290 lb 9.6 oz (131.8 kg)   BMI 40.53 kg/m²      ORTHO EXAMINATION:  Examination Right knee Left knee   Skin Intact Intact   Range of motion 120-0 100-0   Effusion - -   Medial joint line tenderness - +   Lateral joint line tenderness - -   Popliteal tenderness - -   Osteophytes palpable - +   Pedritos - -   Patella crepitus - +   Anterior drawer - -   Lateral laxity - -   Medial laxity - -   Varus deformity - -   Valgus deformity - -   Pretibial edema - +   Calf tenderness - -     TIME OUT:  Chart reviewed for the following:   I, Concha Jimenez MD, have reviewed the History, Physical and updated the Allergic reactions for [address] performed immediately prior to start of procedure:  Davis Hinton MD, have performed the following reviews on [address] [place] [address] St. prior to the start of the procedure:          * Patient was identified by name and date of birth   * Agreement on procedure being performed was verified  * Risks and Benefits explained to the patient  * Procedure site verified and marked as necessary  * Patient was positioned for comfort  * Consent was obtained     Time: 3:32 PM     Date of procedure: 6/8/2020  Procedure performed by:  Concha Jimenez MD  Mr. Londono tolerated the procedure well with no complications. RADIOGRAPHS:  XR LEFT KNEE 4/30/20 [facility]  IMPRESSION:   No acute osseous findings. Small to moderate volume joint effusion suspected. -I have independently reviewed these images during this office visit. -Dr. Hailee Gongora:  Three views - No fractures, no effusion, mild joint space narrowing, + osteophytes present. Kellgren Kj grade 1      IMPRESSION:      ICD-10-CM ICD-9-CM    1. Primary osteoarthritis of left knee M17.12 715.16 PROCEDURE AUTHORIZATION TO       betamethasone (Celestone Soluspan) 6 mg/mL injection      BETAMETHASONE ACETATE & SODIUM PHOSPHATE INJECTION 3 MG EA.      DRAIN/INJECT LARGE JOINT/BURSA   2. Chronic pain of left knee M25.562 719.46 PROCEDURE AUTHORIZATION TO     G89.29 338.29 betamethasone (Celestone Soluspan) 6 mg/mL injection      BETAMETHASONE ACETATE & SODIUM PHOSPHATE INJECTION 3 MG EA.      DRAIN/INJECT LARGE JOINT/BURSA     PLAN:  After discussing treatment options, patient's left was injected with 4 cc Marcaine and 1/2 cc Celestone.  There is no need for surgery at this time. We discussed a possible need for left knee MRI in the future if pain continues. He will follow up as needed.       Scribed by Kacy Post (Avi Robertson) as dictated by Noman Loaiza MD

## 2020-06-18 ENCOUNTER — TELEPHONE (OUTPATIENT)
Dept: ORTHOPEDIC SURGERY | Facility: CLINIC | Age: 60
End: 2020-06-18

## 2020-06-18 DIAGNOSIS — G89.29 CHRONIC PAIN OF LEFT KNEE: ICD-10-CM

## 2020-06-18 DIAGNOSIS — M25.562 CHRONIC PAIN OF LEFT KNEE: ICD-10-CM

## 2020-06-18 DIAGNOSIS — M17.12 PRIMARY OSTEOARTHRITIS OF LEFT KNEE: Primary | ICD-10-CM

## 2020-06-18 NOTE — TELEPHONE ENCOUNTER
Patient called wishing to schedule the MRI that was discussed in his last office visit. He said that the pain is back after the cortisone injection.  Please advise patient at [phone]

## 2020-06-23 ENCOUNTER — TELEPHONE (OUTPATIENT)
Dept: FAMILY MEDICINE CLINIC | Age: 60
End: 2020-06-23

## 2020-06-23 NOTE — TELEPHONE ENCOUNTER
Spoke with patient. He complains of slight dizziness on Januvia. He reports that initially he was supposed to take Jardiance but it was not covered by insurance so he was prescribed Januvia insteadx 2.5 weeks ago. He started noticing dizziness especially when his eyes were closed last Thursday or Friday. He has been checking home blood sugars to ensure that he was not experiencing hypogylcemia but none of his blood sugars were below 120. He read medication leaflet from pharmacy and dizziness is a side effect of Januvia. Patient stopped medication on Friday/Saturday and is still having slight dizziness despite being off medication for several days. He reports he has been drinking plenty of fluids and has been isolating as much as possible.

## 2020-06-23 NOTE — TELEPHONE ENCOUNTER
Colonoscopy Preparation Instructions    YOU ARE SCHEDULED FOR A COLONOSCOPY WITH:  Levi Carcamo MD       Date: April 25, 2017  Location:   [address] [place] [address]    Phone Number: [phone] (after 5 pm for Emergency Calls Only)    HOLD ASPIRIN FOR 5 DAYS PRIOR TO PROCEDURE.    *[facility] Admissions will be contacting you the day before your procedure to confirm your health history and arrival time.     MIRALAX BOWEL PREPARATION:  To complete a successful colonoscopy, the bowel must be clean so that the physician can clearly view the colon.  It is very important that you read all the instructions well in advance of the procedure.  Without proper preparation, the procedure will not be successful and the test may need to be repeated.    ITEMS NEEDED FOR YOUR BOWEL PREP:   · 2 Dulcolax(bisacodyl) laxative tablets-5 mg each.   · 527 gram bottle of Miralax (Polyethyline Glycol).  · 1 Gallon (128 oz) of Gatorade (not red, pink, or purple).  · May also use Crystal Light, lemonade, iced tea or flavored water.      5 DAYS BEFORE YOUR PROCEDURE:   · Stop the following medications 5 days before your procedure as they may cause blood thinning.  · Aspirin, Bufferin, Anacin, Ecotrin, Excedrin, Advil, Motrin/ibuprofen, Nuprin, Indocin, Naproxen, Aleve, Pepto-Bismol, Ava [place], Glucosamine, Diclofenac(Voltaren), Iron Supplements and herbal supplements.  · You may safely use Tylenol or Acetaminophen as directed on the bottle.   · Do not eat popcorn, seeds, nuts, whole kernel corn or products with [place](a fat substitute found in [employer] and [place] Fat Free products) for 5 days prior.    3 DAYS BEFORE YOUR PROCEDURE:    · Do not consume alcoholic beverages. This can also put you at risk for bleeding.                         PREP DAY:                                      DAY BEFORE YOUR COLONOSCOPY:  · CLEAR LIQUID DIET (Drink Clear  Spoke with patient. He was advised that per Dr. Colin Vaca, his dizziness is possibly med side effect, especially if timing of symptoms were upon onset of taking meds. It may take more than a few days to clear out of his system. Keep checking glucose levels daily, advise/schedule in person ff-up thurs or Friday for a more thorough eval of dizziness. Patient voices understanding and has been scheduled for 6/25/2020 @ 8:00. liquids only all day today).   · You may have any of the following :  · Water, Coffee or Tea without Creamer, Gatorade or other Sports Drinks, popsicles, carbonated water or soft drinks, Cam-Aid, plain Jello without fruit or toppings, hard candy, Vegetable, Beef or Chicken Broth.  You may also have fruit juices that are pulp free, such as:  Apple, orange or white Grape.  .    · You may not have any solid foods, dairy products, or anything that is Red, Pink or Purple in color  · Do not take Lomotil, Imodium, or fiber supplement today.     At 3 pm on prep day  · Take 2 Dulcolax tablets by mouth with 8 ounces of Clear liquid.  · Mix Miralax powder and Gatorade (or other clear liquid) in a Gallon sized  pitcher. Shake or stir until the powder is dissolved. You may refrigerate to chill if you wish.     At 5 pm on prep day    Start drinking the liquid prep. Drink an 8-10 ounce glass every 15-20 minutes until the entire gallon of prep is completed.   · If you develop nausea, cramping or fullness take a break from the prep. Resume prep as soon as possible. You may resume early tomorrow morning if needed. Please wake up 6 hours before you arrive to resume remaining prep in the morning.   · If you do not have any bowel movement , or if you have severe pain or vomiting, call [phone] to page the GI doctor on call.     DAY OF YOUR COLONOSCOPY:  · YOU MAY CONTINUE CLEAR LIQUIDS UP UNTIL 4 HOURS BEFORE YOU ARRIVE. Any remaining prep will need to be completed by this time.   · Take blood pressure, seizure and Parkinson's medications with a few sips of water 2 hours before you arrive.      AFTER YOUR COLONOSCOPY:  You will receive after-procedure information and instructions in recovery.  You will need to rest for the remainder of the day.   Do not drive, work or operate machinery for 24 hours.   · Do not drink alcohol for 24 hours.                           IMPORTANT INFORMATION TO KNOW FOR YOUR PROCEDURE:   · Check with your  insurance company to verify benefit coverage for this procedure. It is the patient's responsibility to verify insurance benefits. If you have had previous polyps removed or are having symptoms, this procedure will be considered \"Diagnostic\" not a \"Screening.\"  Contact our office if you take blood thinners (Coumadin, Warfarin, Plavix, etc), if you have artificial joints, or if you are diabetic. Special arrangements will need to be made for your procedure.   · IF YOU USE A C-PAP OR BI-PAP MACHINE, PLEASE BRING IT WITH YOU ON THE DAY OF PROCEDURE.  · Bring a Photo ID with you the day of your procedure.     A responsible adult over the age of 18 must accompany you from the hospital after your exam or YOUR APPOINTMENT WILL BE CANCELLED. Your  will need to remain in the building during your procedure.      CANCELLATION OR RESCHEDULING:  If you must reschedule or cancel your appointment, please notify your physician's office at least 48 hours in advance      What is colonoscopy?   Colonoscopy is a procedure that allows your doctor to clearly see the lining of your colon (large bowel).    What happens during the colonoscopy?   You will be given IV medications to help you relax and stay comfortable during the exam. You will lie on your side or on your back while the flexible tube is moved slowly through the large bowel.. You may feel some cramping or gas but the medicine should keep you comfortable.     Are there complications of colonoscopy?   Complications after a colonoscopy are rare, but can occur.   Risks and possible complications include:   Perforation   Bleeding   Infection   Missed polyps/diagnostic error   Adverse reaction to medication     IF YOU HAVE ANY QUESTIONS OR CONCERNS REGARDING THESE INSTRUCTIONS OR YOUR PROCEDURE PLEASE CONTACT OUR OFFICE -[phone]

## 2020-06-23 NOTE — TELEPHONE ENCOUNTER
Pt has question about the side effects of new medication that the pt was place on.  The SITagliptin (JANUVIA) 100 mg tablet

## 2020-06-23 NOTE — TELEPHONE ENCOUNTER
Possibly med side effect, especially if timing of symptoms were upon onset of taking meds. It may take more than a few days to clear out of his system. Keep checking glucose levels daily, advise/schedule in person ff-up thurs or Friday for a more thorough eval of dizziness.

## 2020-06-24 ENCOUNTER — TELEPHONE (OUTPATIENT)
Dept: FAMILY MEDICINE CLINIC | Age: 60
End: 2020-06-24

## 2020-06-24 NOTE — TELEPHONE ENCOUNTER
Have you been diagnosed with, tested for, or told that you are suspected of having COVID-19 (coronovirus)? Have you had a fever or taken medication to treat a fever in the past 72 hours? Have you had a cough, SOB, or flu-like symptoms within the past 3 days? Have you had direct contact with someone who tested positive for COVID-19 within the past 14 days? Do you have a household member with flu-like symptoms, including fever, cough, or SOB? Do you currently have flu-like symptoms including fever, cough, or SOB?         ALL ANSWERS TO THE ABOVE QUESTIONS IS NO

## 2020-06-25 ENCOUNTER — OFFICE VISIT (OUTPATIENT)
Dept: FAMILY MEDICINE CLINIC | Age: 60
End: 2020-06-25

## 2020-06-25 VITALS
HEART RATE: 72 BPM | HEIGHT: 71 IN | SYSTOLIC BLOOD PRESSURE: 136 MMHG | WEIGHT: 290 LBS | RESPIRATION RATE: 16 BRPM | BODY MASS INDEX: 40.6 KG/M2 | OXYGEN SATURATION: 98 % | DIASTOLIC BLOOD PRESSURE: 86 MMHG | TEMPERATURE: 98.2 F

## 2020-06-25 DIAGNOSIS — E11.9 TYPE 2 DIABETES MELLITUS WITHOUT COMPLICATION, WITHOUT LONG-TERM CURRENT USE OF INSULIN (HCC): ICD-10-CM

## 2020-06-25 DIAGNOSIS — Z99.89 OSA ON CPAP: ICD-10-CM

## 2020-06-25 DIAGNOSIS — E78.00 PURE HYPERCHOLESTEROLEMIA: ICD-10-CM

## 2020-06-25 DIAGNOSIS — I10 ESSENTIAL HYPERTENSION: ICD-10-CM

## 2020-06-25 DIAGNOSIS — K21.9 GASTROESOPHAGEAL REFLUX DISEASE, ESOPHAGITIS PRESENCE NOT SPECIFIED: ICD-10-CM

## 2020-06-25 DIAGNOSIS — G47.33 OSA ON CPAP: ICD-10-CM

## 2020-06-25 DIAGNOSIS — R42 VERTIGO: Primary | ICD-10-CM

## 2020-06-25 NOTE — PATIENT INSTRUCTIONS

## 2020-06-25 NOTE — PROGRESS NOTES
1. Have you been to the ER, urgent care clinic since your last visit? Hospitalized since your last visit? No    2. Have you seen or consulted any other health care providers outside of the [address] [place] [address] since your last visit? Include any pap smears or colon screening.  No

## 2020-06-25 NOTE — PROGRESS NOTES
Angelo Lamas., 64 y.o.,  male    SUBJECTIVE  Dizziness since 6/8      Pt reports dizziness, described as spinning sensation of room in bed at night since starting januvia. He has checked BG levels multiple times 110 is the lowest. His symptom resolved today 3 days after stopping medication. He has chronic tinnitus, no hearing loss, not worse with head movement. Denies palpitations, CP,  presyncopal symptoms. He continues to take metformin. He was previously on GLP1 tanzeum, SGLT2 not covered by his insurance. He reports improved lifestyle and diet, starting to resume his routine of going to work where he works out. HTN/HL- taking meds without problems. JANELLE on cpap-consistent use    Ongoing ortho eval for knee pain, upcoming MRI    ROS:  See HPI, all others negative        Patient Active Problem List   Diagnosis Code    VALDES (nonalcoholic steatohepatitis) K75.81    Essential hypertension I10    Hyperlipidemia E78.5    JANELLE on CPAP G47.33, Z99.89    BMI 40.0-44.9, adult ([facility]) Z68.41    Type 2 diabetes mellitus without complication ([facility]) V15.2    Gastroesophageal reflux disease K21.9    Hx of colonoscopy-update 2022 dr Eric Young Z98.890       Current Outpatient Medications   Medication Sig Dispense Refill    metFORMIN (GLUCOPHAGE) 500 mg tablet Take 500 mg by mouth two (2) times a day.  atorvastatin (LIPITOR) 20 mg tablet Take 1 Tab by mouth daily. 90 Tab 3    hydroCHLOROthiazide (HYDRODIURIL) 25 mg tablet Take 1 Tab by mouth daily. 90 Tab 3    lisinopril (PRINIVIL, ZESTRIL) 40 mg tablet Take 1 Tab by mouth daily. 90 Tab 3    aspirin delayed-release 81 mg tablet Take 1 Tab by mouth daily. 90 Tab 3    glucose blood VI test strips (PRECISION XTRA TEST) strip Check fasting glucose once daily 100 Strip 11    cholecalciferol, VITAMIN D3, (VITAMIN D3) 5,000 unit tab tablet Take 10,000 Units by mouth daily.          No Known Allergies    Past Medical History:   Diagnosis Date    Agatston CAC score, <100 12/09/2015    Coronary calcium score 0.    Diabetes ([facility]     Dyslipidemia     Echocardiogram 11/09/2015    EF 60%. No WMA. Mild LVH. Normal LV diastolic fx. Mild RVE.  GERD (gastroesophageal reflux disease)     Hx of colonoscopy 02/15/2017    repeat 5  yrs 2022, dr. Estrella See Hypertension     VALDES (nonalcoholic steatohepatitis)     Nuclear cardiac imaging test 11/09/2015    Low risk. No ischemia or infarction by perfusion imaging. EF 61%. No RWMA. Likely false positive EKG chgs on max EST. No chest pain. Ex time 9 mins 15 secs.     JANELLE on CPAP        Social History     Socioeconomic History    Marital status:      Spouse name: Not on file    Number of children: Not on file    Years of education: Not on file    Highest education level: Not on file   Occupational History    Not on file   Social Needs    Financial resource strain: Not on file    Food insecurity     Worry: Not on file     Inability: Not on file    Transportation needs     Medical: Not on file     Non-medical: Not on file   Tobacco Use    Smoking status: Never Smoker    Smokeless tobacco: Never Used   Substance and Sexual Activity    Alcohol use: Yes     Comment: 4/week    Drug use: No    Sexual activity: Yes     Partners: Female   Lifestyle    Physical activity     Days per week: Not on file     Minutes per session: Not on file    Stress: Not on file   Relationships    Social connections     Talks on phone: Not on file     Gets together: Not on file     Attends Scientologist service: Not on file     Active member of club or organization: Not on file     Attends meetings of clubs or organizations: Not on file     Relationship status: Not on file    Intimate partner violence     Fear of current or ex partner: Not on file     Emotionally abused: Not on file     Physically abused: Not on file     Forced sexual activity: Not on file   Other Topics Concern    Not on file   Social History Narrative    Not on file       Family History   Problem Relation Age of Onset    Hypertension Mother     Elevated Lipids Mother     Diabetes Mother     Cancer Father         Prostate         OBJECTIVE    Physical Exam:     Visit Vitals  /86 (BP 1 Location: Left arm, BP Patient Position: Sitting)   Pulse 72   Temp 98.2 °F (36.8 °C) (Oral)   Resp 16   Ht 5' 11\" (1.803 m)   Wt 290 lb (131.5 kg)   SpO2 98%   BMI 40.45 kg/m²       General: alert, well-appearing, obese,in no apparent distress or pain  Head: atraumatic. Non-tender maxillary and frontal sinuses  Eyes: Lids with no discharge, no matting, conjunctivae clear and non injected, full EOMs, PERLLA  Ears: pinna non-tender, external auditory canal patent, TM intact  Mouth/throat:tonsils non enlarged, pharynx non erythematous and no lesion, nasal mucosa normal  Neck: supple, no adenopathy palpated  CVS: normal rate, regular rhythm, distinct S1 and S2  Lungs:clear to ausculation bilaterally, no crackles, wheezing or rhonchi noted  Abdomen: normoactive bowel sounds, soft, non-tender  Extremities: no edema, no cyanosis, MSK grossly normal  Skin: warm, no lesions, rashes noted  Psych:  mood and affect normal        ASSESSMENT/PLAN  Diagnoses and all orders for this visit:    Vertigo  Likely januvia side effects with timing of symptoms and resolution after few days of cessation  Monitoring    Type 2 diabetes mellitus without complication, without long-term current use of insulin (HCC)  a1c 7.3  Discussed options, pt wants to cont lifestyle modifications and monitor, I am agreeable  If >7 in aug visit, plan on GLP1  -     HEMOGLOBIN A1C W/O EAG; Future  -     METABOLIC PANEL, COMPREHENSIVE;  Future     BMI 40.0-44.9, adult (HCC)     JANELLE on CPAP  Advised consistent use    Pure hypercholesterolemia  LDL Goal <100  At goal, cont liptor  Lipid panel 5/2020    Essential hypertension  controlled  Cont ace-i    Follow-up and Dispositions    · Return in about 2 months (around 8/25/2020) for fasting labs a week prior to your next visit, routine chronic illness care. Patient understands plan of care. Patient has provided input and agrees with goals.

## 2020-07-01 ENCOUNTER — HOSPITAL ENCOUNTER (OUTPATIENT)
Age: 60
Discharge: HOME | End: 2020-07-01
Attending: SPECIALIST
Payer: OTHER GOVERNMENT

## 2020-07-01 DIAGNOSIS — M25.562 CHRONIC PAIN OF LEFT KNEE: ICD-10-CM

## 2020-07-01 DIAGNOSIS — G89.29 CHRONIC PAIN OF LEFT KNEE: ICD-10-CM

## 2020-07-01 DIAGNOSIS — M17.12 PRIMARY OSTEOARTHRITIS OF LEFT KNEE: ICD-10-CM

## 2020-07-01 PROCEDURE — 73721 MRI JNT OF LWR EXTRE W/O DYE: CPT

## 2020-07-06 ENCOUNTER — OFFICE VISIT (OUTPATIENT)
Dept: ORTHOPEDIC SURGERY | Facility: CLINIC | Age: 60
End: 2020-07-06

## 2020-07-06 ENCOUNTER — DOCUMENTATION ONLY (OUTPATIENT)
Dept: ORTHOPEDIC SURGERY | Facility: CLINIC | Age: 60
End: 2020-07-06

## 2020-07-06 VITALS
SYSTOLIC BLOOD PRESSURE: 122 MMHG | DIASTOLIC BLOOD PRESSURE: 78 MMHG | HEART RATE: 67 BPM | BODY MASS INDEX: 40.31 KG/M2 | WEIGHT: 289 LBS | TEMPERATURE: 98 F | OXYGEN SATURATION: 98 % | RESPIRATION RATE: 16 BRPM

## 2020-07-06 DIAGNOSIS — M17.12 PRIMARY OSTEOARTHRITIS OF LEFT KNEE: ICD-10-CM

## 2020-07-06 DIAGNOSIS — G89.29 CHRONIC PAIN OF LEFT KNEE: ICD-10-CM

## 2020-07-06 DIAGNOSIS — M25.562 CHRONIC PAIN OF LEFT KNEE: ICD-10-CM

## 2020-07-06 DIAGNOSIS — S83.242A TEAR OF MEDIAL MENISCUS OF LEFT KNEE, UNSPECIFIED TEAR TYPE, UNSPECIFIED WHETHER OLD OR CURRENT TEAR, INITIAL ENCOUNTER: Primary | ICD-10-CM

## 2020-07-06 NOTE — PROGRESS NOTES
Patient: Olamide Jesus. MRN: [id number]       SSN: [id number]  YOB: 1960        AGE: 64 y.o. SEX: male    PCP: Maren Kirk MD  07/06/20    Chief Complaint   Patient presents with    Follow-up     MRI of left knee    Knee Pain     left     HISTORY:  Olamide Marie is a 64 y.o. male who is seen for left kne pain. He has been experiencing left knee pain for the past several months. He first noticed his pain when his dogs started to pull while walking on uneven terrain at a farm near his home in [place], [place]. His dogs took off after a squirrel. He feels mostly medial knee pain. He describes the pain as achy and burning. He notes pain with standing, walking and stair climbing. He experiences startup pain after sitting. His pain wakes him up at night and he has trouble finding a comfortable sleeping position. He has discomfort while driving. He takes aspirin with little relief. 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 He has a h/o over 2500 jumps. He has pain after running a long distance on a treadmill. He complains of knee aggravation when he rides his . He states his cortisone shot from his last OV relieved his pain for 2 days. Pain Assessment  7/6/2020   Location of Pain Knee   Location Modifiers Left   Severity of Pain 6   Quality of Pain Dull;Aching   Duration of Pain Persistent   Frequency of Pain Constant   Aggravating Factors Standing;Walking;Stairs   Limiting Behavior Some   Relieving Factors -   Result of Injury No     Occupation, etc:  Mr. Duane Gauthier he is a  at [employer]. He served 21 years in the [employer] and retired in 2001. He lives in [place], [place] with his wife. He has two standard poodles-- Brie and [place]-- that like to mendy after squirrels and rabbits. He has 1 adult son and 1 adult daughter. He uses a treadmill at home--runs about half a mile.  He recently lost 4 pounds. He is currently using The Plan diet--low carbs and high protein. Mr. Zahida Fulton weighs 289 lbs and is 5'11\" tall.        Lab Results   Component Value Date/Time    Hemoglobin A1c 7.3 (H) 04/30/2020 07:12 AM    Hemoglobin A1c (POC) 6.1 05/05/2016 08:13 AM    Hemoglobin A1c, External 6.1 12/20/2017     Weight Metrics 7/6/2020 6/25/2020 6/8/2020 9/5/2017 7/10/2017 4/3/2017 12/28/2016   Weight 289 lb 290 lb 290 lb 9.6 oz 298 lb 292 lb 289 lb 292 lb   BMI 40.31 kg/m2 40.45 kg/m2 40.53 kg/m2 41.56 kg/m2 40.73 kg/m2 40.31 kg/m2 40.73 kg/m2       Patient Active Problem List   Diagnosis Code    VALDES (nonalcoholic steatohepatitis) K75.81    Essential hypertension I10    Hyperlipidemia E78.5    JANELLE on CPAP G47.33, Z99.89    [facility] 40.0-44.9[facility] Z68.41    Type 2 diabetes mellitus without complication (HCC) S39.8    Gastroesophageal reflux disease K21.9    Hx of colonoscopy-update 2022 dr Yaron Martinez Z98.890     REVIEW OF SYSTEMS:    Constitutional Symptoms: Negative   Eyes: Negative   Ears, Nose, Throat and Mouth: Negative   Cardiovascular: Negative   Respiratory: Negative   Genitourinary: Per HPI   Gastrointestinal: Per HPI   Integumentary (Skin and/or Breast): Negative   Musculoskeletal: Per HPI   Endocrine/Rheumatologic: Negative   Neurological: Per HPI   Hematology/Lymphatic: Negative    Allergic/Immunologic: Negative   Phychiatric: Negative    Social History     Socioeconomic History    Marital status:      Spouse name: Not on file    Number of children: Not on file    Years of education: Not on file    Highest education level: Not on file   Occupational History    Not on file   Social Needs    Financial resource strain: Not on file    Food insecurity     Worry: Not on file     Inability: Not on file    Transportation needs     Medical: Not on file     Non-medical: Not on file   Tobacco Use    Smoking status: Never Smoker    Smokeless tobacco: Never Used   Substance and Sexual Activity    Alcohol use: Yes     Comment: 4/week    Drug use: No    Sexual activity: Yes     Partners: Female   Lifestyle    Physical activity     Days per week: Not on file     Minutes per session: Not on file    Stress: Not on file   Relationships    Social connections     Talks on phone: Not on file     Gets together: Not on file     Attends Yazidi service: Not on file     Active member of club or organization: Not on file     Attends meetings of clubs or organizations: Not on file     Relationship status: Not on file    Intimate partner violence     Fear of current or ex partner: Not on file     Emotionally abused: Not on file     Physically abused: Not on file     Forced sexual activity: Not on file   Other Topics Concern    Not on file   Social History Narrative    Not on file      No Known Allergies   Current Outpatient Medications   Medication Sig    IBUPROFEN PO Take  by mouth.  metFORMIN (GLUCOPHAGE) 500 mg tablet Take 500 mg by mouth two (2) times a day.  atorvastatin (LIPITOR) 20 mg tablet Take 1 Tab by mouth daily.  hydroCHLOROthiazide (HYDRODIURIL) 25 mg tablet Take 1 Tab by mouth daily.  lisinopril (PRINIVIL, ZESTRIL) 40 mg tablet Take 1 Tab by mouth daily.  aspirin delayed-release 81 mg tablet Take 1 Tab by mouth daily.  glucose blood VI test strips (PRECISION XTRA TEST) strip Check fasting glucose once daily    cholecalciferol, VITAMIN D3, (VITAMIN D3) 5,000 unit tab tablet Take 10,000 Units by mouth daily. No current facility-administered medications for this visit.        PHYSICAL EXAMINATION:  Visit Vitals  /78 (BP 1 Location: Left arm, BP Patient Position: Sitting)   Pulse 67   Temp 98 °F (36.7 °C) (Temporal)   Resp 16   Wt 289 lb (131.1 kg)   SpO2 98%   BMI 40.31 kg/m²      ORTHO EXAMINATION:  Examination Right knee Left knee   Skin Intact Intact   Range of motion 120-0 100-0   Effusion - -   Medial joint line tenderness - +   Lateral joint line tenderness - -   Popliteal tenderness - -   Osteophytes palpable - +   Pedritos - -   Patella crepitus - +   Anterior drawer - -   Lateral laxity - -   Medial laxity - -   Varus deformity - -   Valgus deformity - -   Pretibial edema - +   Calf tenderness - -       MRI LEFT KNEE WO CONT 7/1/20 [facility]  IMPRESSION:   Medial meniscal tear.   Mild MCL sprain.   Moderate popliteus tendinopathy and interstitial tearing.   Mild fibular collateral ligament sprain.   Findings which may reflect acute and/or chronic sprain of the retinaculum.   Moderate knee effusion with evidence of chronicity and synovial hypertrophy.   Nonspecific subcutaneous edema which may reflect developing bursitis.   Please see report for additional findings and further details. RADIOGRAPHS:  XR LEFT KNEE 4/30/20 [facility]  IMPRESSION:   No acute osseous findings. Small to moderate volume joint effusion suspected. -I have independently reviewed these images during this office visit. -Dr. Messi Godwin:  Three views - No fractures, no effusion, mild joint space narrowing, + osteophytes present. Kellgren Kj grade 1      IMPRESSION:      ICD-10-CM ICD-9-CM    1. Tear of medial meniscus of left knee, unspecified tear type, unspecified whether old or current tear, initial encounter S83.242A 836.0 PROCEDURE AUTHORIZATION TO    2. Primary osteoarthritis of left knee M17.12 715.16 PROCEDURE AUTHORIZATION TO    3. Chronic pain of left knee M25.562 719.46 PROCEDURE AUTHORIZATION TO     G89.29 338.29      PLAN:  We discussed possible need for left knee menisectomy at some time in the future if pain continues. Dietary counseling provided today. Start weight loss with low carb diet and intermittent fasting. Consider visco supplementation if pain continues. Continue to exercise. He will follow up as needed.       Scribed by Aneta Champagne ([address]) as dictated by Kassy Basilio MD

## 2020-07-16 ENCOUNTER — OFFICE VISIT (OUTPATIENT)
Dept: ORTHOPEDIC SURGERY | Facility: CLINIC | Age: 60
End: 2020-07-16

## 2020-07-16 VITALS
BODY MASS INDEX: 41.02 KG/M2 | SYSTOLIC BLOOD PRESSURE: 130 MMHG | OXYGEN SATURATION: 97 % | HEART RATE: 68 BPM | WEIGHT: 293 LBS | HEIGHT: 71 IN | TEMPERATURE: 96.7 F | DIASTOLIC BLOOD PRESSURE: 79 MMHG

## 2020-07-16 DIAGNOSIS — G89.29 CHRONIC PAIN OF LEFT KNEE: ICD-10-CM

## 2020-07-16 DIAGNOSIS — M25.562 CHRONIC PAIN OF LEFT KNEE: ICD-10-CM

## 2020-07-16 DIAGNOSIS — M17.12 PRIMARY OSTEOARTHRITIS OF LEFT KNEE: Primary | ICD-10-CM

## 2020-07-16 RX ORDER — HYALURONATE SODIUM 10 MG/ML
2 SYRINGE (ML) INTRAARTICULAR ONCE
Qty: 2 ML | Refills: 0
Start: 2020-07-16 | End: 2020-07-16

## 2020-07-16 NOTE — PROGRESS NOTES
Patient: Tona Arroyo. MRN: [id number]       SSN: [id number]  YOB: 1960        AGE: 64 y.o. SEX: male    PCP: Ovidio Gleason MD  07/16/20    Chief Complaint   Patient presents with    Knee Pain     Left knee pain     HISTORY:  Tona Arroyo. is a 64 y.o. male who is seen for left kne pain. He has been experiencing left knee pain for the past several months. He first noticed his pain when his dogs started to pull while walking on uneven terrain at a farm near his home in [place], [place]. His dogs took off after a squirrel. He feels mostly medial knee pain. He describes the pain as achy and burning. He notes pain with standing, walking and stair climbing. He experiences startup pain after sitting. His pain wakes him up at night and he has trouble finding a comfortable sleeping position. He has discomfort while driving. He takes aspirin with little relief. 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 He has a h/o over 2500 jumps. He has pain after running a long distance on a treadmill. He complains of knee aggravation when he rides his . He states his cortisone shot from his last OV relieved his pain for 2 days. He has relief with Motrin. Pain Assessment  7/16/2020   Location of Pain Knee   Location Modifiers Left   Severity of Pain 4   Quality of Pain Dull;Aching   Duration of Pain Persistent   Frequency of Pain Constant   Aggravating Factors Stairs; Walking;Standing;Bending   Limiting Behavior Yes   Relieving Factors NSAID   Result of Injury No     Occupation, etc:  Mr. Yue Choi he is a  at [employer]. He served 21 years in the [employer] and retired in 2001. He lives in [place], [place] with his wife. He has two standard poodles-- Brie and [place]-- that like to mendy after squirrels and rabbits. He has 1 adult son and 1 adult daughter.  He uses a treadmill at home--runs about half a mile. He recently lost 4 pounds. He is currently using The Plan diet--low carbs and high protein. Mr. Lorenzo Nails weighs 293 lbs and is 5'11\" tall.        Lab Results   Component Value Date/Time    Hemoglobin A1c 7.3 (H) 04/30/2020 07:12 AM    Hemoglobin A1c (POC) 6.1 05/05/2016 08:13 AM    Hemoglobin A1c, External 6.1 12/20/2017     Weight Metrics 7/16/2020 7/6/2020 6/25/2020 6/8/2020 9/5/2017 7/10/2017 4/3/2017   Weight 293 lb 289 lb 290 lb 290 lb 9.6 oz 298 lb 292 lb 289 lb   BMI 40.87 kg/m2 40.31 kg/m2 40.45 kg/m2 40.53 kg/m2 41.56 kg/m2 40.73 kg/m2 40.31 kg/m2       Patient Active Problem List   Diagnosis Code    VALDES (nonalcoholic steatohepatitis) K75.81    Essential hypertension I10    Hyperlipidemia E78.5    JANELLE on CPAP G47.33, Z99.89    BMI 40.0-44.9, adult ([facility] Z68.41    Type 2 diabetes mellitus without complication (HCC) A63.3    Gastroesophageal reflux disease K21.9    Hx of colonoscopy-update 2022 dr Tracy Ellis Z98.890     REVIEW OF SYSTEMS:    Constitutional Symptoms: Negative   Eyes: Negative   Ears, Nose, Throat and Mouth: Negative   Cardiovascular: Negative   Respiratory: Negative   Genitourinary: Per HPI   Gastrointestinal: Per HPI   Integumentary (Skin and/or Breast): Negative   Musculoskeletal: Per HPI   Endocrine/Rheumatologic: Negative   Neurological: Per HPI   Hematology/Lymphatic: Negative    Allergic/Immunologic: Negative   Phychiatric: Negative    Social History     Socioeconomic History    Marital status:      Spouse name: Not on file    Number of children: Not on file    Years of education: Not on file    Highest education level: Not on file   Occupational History    Not on file   Social Needs    Financial resource strain: Not on file    Food insecurity     Worry: Not on file     Inability: Not on file    Transportation needs     Medical: Not on file     Non-medical: Not on file   Tobacco Use    Smoking status: Never Smoker    Smokeless tobacco: Never Used   Substance and Sexual Activity    Alcohol use: Yes     Comment: 4/week    Drug use: No    Sexual activity: Yes     Partners: Female   Lifestyle    Physical activity     Days per week: Not on file     Minutes per session: Not on file    Stress: Not on file   Relationships    Social connections     Talks on phone: Not on file     Gets together: Not on file     Attends Evangelical service: Not on file     Active member of club or organization: Not on file     Attends meetings of clubs or organizations: Not on file     Relationship status: Not on file    Intimate partner violence     Fear of current or ex partner: Not on file     Emotionally abused: Not on file     Physically abused: Not on file     Forced sexual activity: Not on file   Other Topics Concern    Not on file   Social History Narrative    Not on file      No Known Allergies   Current Outpatient Medications   Medication Sig    IBUPROFEN PO Take  by mouth.  metFORMIN (GLUCOPHAGE) 500 mg tablet Take 500 mg by mouth two (2) times a day.  atorvastatin (LIPITOR) 20 mg tablet Take 1 Tab by mouth daily.  hydroCHLOROthiazide (HYDRODIURIL) 25 mg tablet Take 1 Tab by mouth daily.  lisinopril (PRINIVIL, ZESTRIL) 40 mg tablet Take 1 Tab by mouth daily.  aspirin delayed-release 81 mg tablet Take 1 Tab by mouth daily.  glucose blood VI test strips (PRECISION XTRA TEST) strip Check fasting glucose once daily    cholecalciferol, VITAMIN D3, (VITAMIN D3) 5,000 unit tab tablet Take 10,000 Units by mouth daily. No current facility-administered medications for this visit.        PHYSICAL EXAMINATION:  Visit Vitals  /79 (BP 1 Location: Left arm, BP Patient Position: Sitting)   Pulse 68   Temp (!) 96.7 °F (35.9 °C)   Ht 5' 11\" (1.803 m)   Wt 293 lb (132.9 kg)   SpO2 97%   BMI 40.87 kg/m²      ORTHO EXAMINATION:  Examination Right knee Left knee   Skin Intact Intact   Range of motion 120-0 100-0   Effusion - -   Medial joint line tenderness - +   Lateral joint line tenderness - -   Popliteal tenderness - -   Osteophytes palpable - +   Pedritos - -   Patella crepitus - +   Anterior drawer - -   Lateral laxity - -   Medial laxity - -   Varus deformity - -   Valgus deformity - -   Pretibial edema - +   Calf tenderness - -     MRI LEFT KNEE WO CONT 7/1/20 [facility]  IMPRESSION:   Medial meniscal tear.   Mild MCL sprain.   Moderate popliteus tendinopathy and interstitial tearing.   Mild fibular collateral ligament sprain.   Findings which may reflect acute and/or chronic sprain of the retinaculum.   Moderate knee effusion with evidence of chronicity and synovial hypertrophy.   Nonspecific subcutaneous edema which may reflect developing bursitis.   Please see report for additional findings and further details. RADIOGRAPHS:  XR LEFT KNEE 4/30/20 [facility]  IMPRESSION:   No acute osseous findings. Small to moderate volume joint effusion suspected. -I have independently reviewed these images during this office visit. -Dr. Tdod Bran:  Three views - No fractures, no effusion, mild joint space narrowing, + osteophytes present. Kellgren Kj grade 1      IMPRESSION:      ICD-10-CM ICD-9-CM    1. Primary osteoarthritis of left knee  M17.12 715.16 CA DRAIN/INJECT LARGE JOINT/BURSA      EUFLEXXA INJECTION PER DOSE      sodium hyaluronate (SUPARTZ FX/HYALGAN/GENIVSC) 10 mg/mL syrg injection   2. Chronic pain of left knee  M25.562 719.46 CA DRAIN/INJECT LARGE JOINT/BURSA    G89.29 338.29 EUFLEXXA INJECTION PER DOSE      sodium hyaluronate (SUPARTZ FX/HYALGAN/GENIVSC) 10 mg/mL syrg injection     PLAN:  After discussing treatment options, patient's left knee was injected with 2 cc Euflexxa. There is no need for surgery at this time. He will follow up in 1 week for Euflexxa #2.     Scribed by Hillary Chang (Michael Gupta) as dictated by America Ny MD

## 2020-07-23 ENCOUNTER — OFFICE VISIT (OUTPATIENT)
Dept: ORTHOPEDIC SURGERY | Facility: CLINIC | Age: 60
End: 2020-07-23

## 2020-07-23 VITALS
HEART RATE: 66 BPM | BODY MASS INDEX: 40.77 KG/M2 | TEMPERATURE: 96.6 F | WEIGHT: 291.2 LBS | RESPIRATION RATE: 16 BRPM | OXYGEN SATURATION: 97 % | SYSTOLIC BLOOD PRESSURE: 131 MMHG | DIASTOLIC BLOOD PRESSURE: 83 MMHG | HEIGHT: 71 IN

## 2020-07-23 DIAGNOSIS — M17.12 PRIMARY OSTEOARTHRITIS OF LEFT KNEE: Primary | ICD-10-CM

## 2020-07-23 DIAGNOSIS — G89.29 CHRONIC PAIN OF LEFT KNEE: ICD-10-CM

## 2020-07-23 DIAGNOSIS — M25.562 CHRONIC PAIN OF LEFT KNEE: ICD-10-CM

## 2020-07-23 RX ORDER — HYALURONATE SODIUM 10 MG/ML
2 SYRINGE (ML) INTRAARTICULAR ONCE
Qty: 2 ML | Refills: 0
Start: 2020-07-23 | End: 2020-07-23

## 2020-07-23 NOTE — PROGRESS NOTES
Patient: Jose Rafael Estrada. MRN: [id number]       SSN: [id number]  YOB: 1960        AGE: 64 y.o. SEX: male  Body mass index is 40.61 kg/m². PCP: Susanne Wheeler MD  07/23/20    Chief Complaint   Patient presents with    Knee Pain     left knee pain     HISTORY:  Jose Rafael Casas is a 64 y.o. male who is seen for left knee pain. ICD-10-CM ICD-9-CM    1. Primary osteoarthritis of left knee  M17.12 715.16 [place] DRAIN/INJECT LARGE JOINT/BURSA      EUFLEXXA INJECTION PER DOSE      sodium hyaluronate (SUPARTZ FX/HYALGAN/GENIVSC) 10 mg/mL syrg injection   2. Chronic pain of left knee  M25.562 719.46 OK DRAIN/INJECT LARGE JOINT/BURSA    G89.29 338.29 EUFLEXXA INJECTION PER DOSE      sodium hyaluronate (SUPARTZ FX/HYALGAN/GENIVSC) 10 mg/mL syrg injection       Chart reviewed for the following:   William Cooper MD, have reviewed the History, Physical and updated the Allergic reactions for [place] performed immediately prior to start of procedure:  William Cooper MD, have performed the following reviews on Jose Rafael Estrada. prior to the start of the procedure:            * Patient was identified by name and date of birth   * Agreement on procedure being performed was verified  * Risks and Benefits explained to the patient  * Procedure site verified and marked as necessary  * Patient was positioned for comfort  * Consent was obtained     Time: 8:38 AM     Date of procedure: 7/23/2020    Procedure performed by:  Kei Mason MD    Mr. Londono tolerated the procedure well with no complications. PLAN:  After timeout and under sterile conditions, left knee aspirated 45 cc of light yellow fluid. The fluid was discarded. After discussing treatment options, patient's left knee was injected with 2 cc of Euflexxa. Mr. Nathan Sullivan will follow up in one week to complete his visco supplementation injection series.       Scribed by Karol Sanches (Ashwin Briseno) as dictated by Melanie Garcia MD

## 2020-07-30 ENCOUNTER — OFFICE VISIT (OUTPATIENT)
Dept: ORTHOPEDIC SURGERY | Facility: CLINIC | Age: 60
End: 2020-07-30

## 2020-07-30 VITALS
SYSTOLIC BLOOD PRESSURE: 130 MMHG | DIASTOLIC BLOOD PRESSURE: 87 MMHG | HEIGHT: 71 IN | TEMPERATURE: 97.1 F | WEIGHT: 289 LBS | HEART RATE: 65 BPM | BODY MASS INDEX: 40.46 KG/M2

## 2020-07-30 DIAGNOSIS — M17.12 PRIMARY OSTEOARTHRITIS OF LEFT KNEE: Primary | ICD-10-CM

## 2020-07-30 DIAGNOSIS — G89.29 CHRONIC PAIN OF LEFT KNEE: ICD-10-CM

## 2020-07-30 DIAGNOSIS — M25.562 CHRONIC PAIN OF LEFT KNEE: ICD-10-CM

## 2020-07-30 RX ORDER — HYALURONATE SODIUM 10 MG/ML
2 SYRINGE (ML) INTRAARTICULAR ONCE
Qty: 2 ML | Refills: 0
Start: 2020-07-30 | End: 2020-07-30

## 2020-07-30 NOTE — PROGRESS NOTES
Patient: Nadine Forbes MRN: [id number]       SSN: [id number]  YOB: 1960        AGE: 64 y.o. SEX: male  Body mass index is 40.31 kg/m². PCP: Nadir Germain MD  07/30/20    Chief Complaint   Patient presents with    Knee Pain     Lt     HISTORY:  Nadine Forbes is a 64 y.o. male who is seen for left knee pain. TIME OUT performed immediately prior to start of procedure:  Coby Artis MD, have performed the following reviews on Nadine Forbes prior to the start of the procedure:            * Patient was identified by name and date of birth   * Agreement on procedure being performed was verified  * Risks and Benefits explained to the patient  * Procedure site verified and marked as necessary  * Patient was positioned for comfort  * Consent was obtained     Time: 8:36 AM    Date of procedure: 7/30/2020    Procedure performed by:  Melanie Garcia MD    Mr. Londono tolerated the procedure well with no complications      [id number] ICD-9-CM    1. Primary osteoarthritis of left knee  M17.12 715.16 AK DRAIN/INJECT LARGE JOINT/BURSA      EUFLEXXA INJECTION PER DOSE      sodium hyaluronate (SUPARTZ FX/HYALGAN/GENIVSC) 10 mg/mL syrg injection   2. Chronic pain of left knee  M25.562 719.46 AK DRAIN/INJECT LARGE JOINT/BURSA    G89.29 338.29 EUFLEXXA INJECTION PER DOSE      sodium hyaluronate (SUPARTZ FX/HYALGAN/GENIVSC) 10 mg/mL syrg injection     PLAN:  After discussing treatment options, patient's left knee was injected with 2 cc of Euflexxa. Mr. Gasper Lguo will follow up PRN now that he has completed his visco supplementation injection series.       Scribed by Helena Hill (Ashwin Briseno) as dictated by Melanie Garcia MD
Pt wants to talk to Dr. Candice Morris to ensure expectations are inline. Pt states that knee is still sore from \"fluid being taken off of knee\" at last appt.
[id number]

## 2021-05-26 ENCOUNTER — TELEPHONE (OUTPATIENT)
Dept: FAMILY MEDICINE CLINIC | Age: 61
End: 2021-05-26

## 2021-05-26 NOTE — TELEPHONE ENCOUNTER
Pt called to let you know he did go to urgent care and was told he has dermatitis. Pt states he was put on a course of steroids.  chilango

## 2021-09-07 NOTE — PROGRESS NOTES
Tamiko Vizcaino., 56 y.o.,  male    SUBJECTIVE  Routine ff-up    DM- reports FBG , on  metformin and tanzeum. was seeing endocrinologist, since well controlled, considering transitioning care with me, I am agreeable    Ht/hl -taking meds without problems. JANELLE- consistent with CPAP use    GERD- doing well on nexium, insurance would no longer cover this PPI brand    Wants to be checked for lymes, reports multiple tick bites in the past and most recent was about 3 weeks ago, does not recall target lesion rash, fever. He does have mild achiness of joints and wondering if this could be contributing. ROS:  See HPI, all others negative        Patient Active Problem List   Diagnosis Code    VALDES (nonalcoholic steatohepatitis) K75.81    Essential hypertension I10    Hyperlipidemia E78.5    JANELLE on CPAP G47.33, Z99.89    BMI 40.0-44.9, [facility] Z68.41    Type 2 diabetes mellitus without complication (HCC) X25.8    Gastroesophageal reflux disease K21.9    Hx of colonoscopy-update 2022 dr Iram Meléndez P38.545       Current Outpatient Prescriptions   Medication Sig Dispense Refill    atorvastatin (LIPITOR) 20 mg tablet Take 1 Tab by mouth daily. 90 Tab 3    hydroCHLOROthiazide (HYDRODIURIL) 25 mg tablet Take 1 Tab by mouth daily. 90 Tab 3    lisinopril (PRINIVIL, ZESTRIL) 40 mg tablet Take 1 Tab by mouth daily. 90 Tab 3    aspirin delayed-release 81 mg tablet Take 1 Tab by mouth daily. 90 Tab 3    pantoprazole (PROTONIX) 20 mg tablet Take 1 Tab by mouth daily. 90 Tab 2    TANZEUM 30 mg/0.5 mL pnij       glucose blood VI test strips (PRECISION XTRA TEST) strip Check fasting glucose once daily 100 Strip 11    metFORMIN (GLUCOPHAGE) 1,000 mg tablet Take 1 Tab by mouth two (2) times daily (with meals). 180 Tab 3    omega-3 fatty acids-vitamin e (FISH OIL) 1,000 mg cap Take 2 Caps by mouth two (2) times a day.       cholecalciferol, VITAMIN D3, (VITAMIN D3) 5,000 unit tab tablet Take 10,000 Units by mouth daily.      zinc 50 mg tab tablet Take 50 mg by mouth daily.  magnesium oxide (MAG-OX) 400 mg tablet Take 400 mg by mouth daily.  POTASSIUM CHLORIDE Take 595 mg by mouth daily.  VITAMIN E, DL,TOCOPHERYL ACET, (VITAMIN E ACETATE) 400 unit cap capsule Take 400 Units by mouth daily. No Known Allergies    Past Medical History:   Diagnosis Date    Agatston CAC score, <100 12/09/2015    Coronary calcium score 0.    Diabetes ([address]     Dyslipidemia     Echocardiogram 11/09/2015    EF 60%. No WMA. Mild LVH. Normal LV diastolic fx. Mild RVE.  GERD (gastroesophageal reflux disease)     Hx of colonoscopy 02/15/2017    repeat 5  yrs 2022, dr. Erum Huff Hypertension     VALDES (nonalcoholic steatohepatitis)     Nuclear cardiac imaging test 11/09/2015    Low risk. No ischemia or infarction by perfusion imaging. EF 61%. No RWMA. Likely false positive EKG chgs on max EST. No chest pain. Ex time 9 mins 15 secs.  JANELLE on CPAP        Social History     Social History    Marital status:      Spouse name: N/A    Number of children: N/A    Years of education: N/A     Occupational History    Not on file.      Social History Main Topics    Smoking status: Never Smoker    Smokeless tobacco: Never Used    Alcohol use Yes      Comment: 4/week    Drug use: No    Sexual activity: Yes     Partners: Female     Other Topics Concern    Not on file     Social History Narrative       Family History   Problem Relation Age of Onset    Hypertension Mother     Elevated Lipids Mother     Diabetes Mother     Cancer Father      Prostate         OBJECTIVE    Physical Exam:     Visit Vitals    /82 (BP 1 Location: Left arm, BP Patient Position: Sitting)    Pulse 80    Temp 98 °F (36.7 °C) (Oral)    Resp 16    Ht 5' 11\" (1.803 m)    Wt 292 lb (132.5 kg)    SpO2 98%    BMI 40.73 kg/m2       General: alert, well-appearing, obese,  in no apparent distress or pain  CVS: normal rate, regular rhythm, distinct S1 and S2  Lungs:clear to ausculation bilaterally, no crackles, wheezing or rhonchi noted  Extremities: no edema, no cyanosis, foot exam with R heel callous and abrasion. Skin: warm, no lesions, rashes noted  Psych:  mood and affect normal        ASSESSMENT/PLAN  Wei Pedraza was seen today for elevated blood pressure and diabetes. Diagnoses and all orders for this visit:    Type 2 diabetes mellitus without complication ([facility]  -well controlled, markedly improved from onset 10>5.7>6.    -continues to see endo, may transition care to me as per pt preference. Eye exam 9/16  Foot exam- 12/16  Microalbumin- 12/16  Lipid panel -12/16  a1c 3/17    Essential hypertension-  controlled, on ace    JANELLE on CPAP-  Referral to neurology    Hyperlipidemia  On lipitor, goal LDL <100  Update 12/17    BMI 40.0-44.9, adult ([facility]-  Reiterated diet, cont with exercise, resume fitbit use aim for 10k daily     GERD-  Switch nexium to protonix due to cost/insurance coverage, discussed dietary changes, smaller meals/less caffeine    Tick bite  h/o  lymes titer    Follow-up Disposition:  Return in about 5 months (around 12/10/2017), or if symptoms worsen or fail to improve. Patient understands plan of care. Patient has provided input and agrees with goals. 0

## 2022-03-20 PROBLEM — Z98.890 HX OF COLONOSCOPY: Status: ACTIVE | Noted: 2017-04-03

## 2023-01-19 NOTE — PROGRESS NOTES
Foreign body not seen on Xray. As discussed on visit, podiatry consult if pain persist in 2 weeks. Strong peripheral pulses/Weak peripheral pulses

## 2023-05-16 RX ORDER — ASPIRIN 81 MG/1
81 TABLET ORAL DAILY
COMMUNITY
Start: 2017-07-10

## 2023-05-16 RX ORDER — HYDROCHLOROTHIAZIDE 25 MG/1
25 TABLET ORAL DAILY
COMMUNITY
Start: 2017-07-10

## 2023-05-16 RX ORDER — LISINOPRIL 40 MG/1
40 TABLET ORAL DAILY
COMMUNITY
Start: 2017-07-10

## 2023-05-16 RX ORDER — ATORVASTATIN CALCIUM 20 MG/1
20 TABLET, FILM COATED ORAL DAILY
COMMUNITY
Start: 2017-07-10